# Patient Record
Sex: FEMALE | Race: WHITE | NOT HISPANIC OR LATINO | URBAN - METROPOLITAN AREA
[De-identification: names, ages, dates, MRNs, and addresses within clinical notes are randomized per-mention and may not be internally consistent; named-entity substitution may affect disease eponyms.]

---

## 2017-09-22 ENCOUNTER — EMERGENCY (EMERGENCY)
Facility: HOSPITAL | Age: 27
LOS: 1 days | Discharge: HOME | End: 2017-09-22
Attending: EMERGENCY MEDICINE | Admitting: EMERGENCY MEDICINE
Payer: MEDICAID

## 2017-09-22 VITALS
RESPIRATION RATE: 18 BRPM | TEMPERATURE: 98 F | DIASTOLIC BLOOD PRESSURE: 69 MMHG | SYSTOLIC BLOOD PRESSURE: 108 MMHG | OXYGEN SATURATION: 98 % | HEART RATE: 98 BPM

## 2017-09-22 LAB
ALBUMIN SERPL ELPH-MCNC: 4.6 G/DL — SIGNIFICANT CHANGE UP (ref 3.3–5)
ALP SERPL-CCNC: 77 U/L — SIGNIFICANT CHANGE UP (ref 40–120)
ALT FLD-CCNC: 14 U/L — SIGNIFICANT CHANGE UP (ref 4–33)
AST SERPL-CCNC: 14 U/L — SIGNIFICANT CHANGE UP (ref 4–32)
BASOPHILS # BLD AUTO: 0.06 K/UL — SIGNIFICANT CHANGE UP (ref 0–0.2)
BASOPHILS NFR BLD AUTO: 0.6 % — SIGNIFICANT CHANGE UP (ref 0–2)
BILIRUB SERPL-MCNC: 0.2 MG/DL — SIGNIFICANT CHANGE UP (ref 0.2–1.2)
BUN SERPL-MCNC: 10 MG/DL — SIGNIFICANT CHANGE UP (ref 7–23)
CALCIUM SERPL-MCNC: 9.1 MG/DL — SIGNIFICANT CHANGE UP (ref 8.4–10.5)
CHLORIDE SERPL-SCNC: 107 MMOL/L — SIGNIFICANT CHANGE UP (ref 98–107)
CK SERPL-CCNC: 60 U/L — SIGNIFICANT CHANGE UP (ref 25–170)
CO2 SERPL-SCNC: 27 MMOL/L — SIGNIFICANT CHANGE UP (ref 22–31)
CREAT SERPL-MCNC: 0.73 MG/DL — SIGNIFICANT CHANGE UP (ref 0.5–1.3)
EOSINOPHIL # BLD AUTO: 0.11 K/UL — SIGNIFICANT CHANGE UP (ref 0–0.5)
EOSINOPHIL NFR BLD AUTO: 1 % — SIGNIFICANT CHANGE UP (ref 0–6)
GLUCOSE SERPL-MCNC: 99 MG/DL — SIGNIFICANT CHANGE UP (ref 70–99)
HCT VFR BLD CALC: 40.8 % — SIGNIFICANT CHANGE UP (ref 34.5–45)
HGB BLD-MCNC: 13.9 G/DL — SIGNIFICANT CHANGE UP (ref 11.5–15.5)
IMM GRANULOCYTES # BLD AUTO: 0.03 # — SIGNIFICANT CHANGE UP
IMM GRANULOCYTES NFR BLD AUTO: 0.3 % — SIGNIFICANT CHANGE UP (ref 0–1.5)
LYMPHOCYTES # BLD AUTO: 3.21 K/UL — SIGNIFICANT CHANGE UP (ref 1–3.3)
LYMPHOCYTES # BLD AUTO: 30.3 % — SIGNIFICANT CHANGE UP (ref 13–44)
MCHC RBC-ENTMCNC: 27.9 PG — SIGNIFICANT CHANGE UP (ref 27–34)
MCHC RBC-ENTMCNC: 34.1 % — SIGNIFICANT CHANGE UP (ref 32–36)
MCV RBC AUTO: 81.9 FL — SIGNIFICANT CHANGE UP (ref 80–100)
MONOCYTES # BLD AUTO: 0.68 K/UL — SIGNIFICANT CHANGE UP (ref 0–0.9)
MONOCYTES NFR BLD AUTO: 6.4 % — SIGNIFICANT CHANGE UP (ref 2–14)
NEUTROPHILS # BLD AUTO: 6.51 K/UL — SIGNIFICANT CHANGE UP (ref 1.8–7.4)
NEUTROPHILS NFR BLD AUTO: 61.4 % — SIGNIFICANT CHANGE UP (ref 43–77)
NRBC # FLD: 0 — SIGNIFICANT CHANGE UP
PLATELET # BLD AUTO: 293 K/UL — SIGNIFICANT CHANGE UP (ref 150–400)
PMV BLD: 9.7 FL — SIGNIFICANT CHANGE UP (ref 7–13)
POTASSIUM SERPL-MCNC: 4.1 MMOL/L — SIGNIFICANT CHANGE UP (ref 3.5–5.3)
POTASSIUM SERPL-SCNC: 4.1 MMOL/L — SIGNIFICANT CHANGE UP (ref 3.5–5.3)
PROT SERPL-MCNC: 7.5 G/DL — SIGNIFICANT CHANGE UP (ref 6–8.3)
RBC # BLD: 4.98 M/UL — SIGNIFICANT CHANGE UP (ref 3.8–5.2)
RBC # FLD: 12.6 % — SIGNIFICANT CHANGE UP (ref 10.3–14.5)
SODIUM SERPL-SCNC: 149 MMOL/L — HIGH (ref 135–145)
TSH SERPL-MCNC: 1.14 UIU/ML — SIGNIFICANT CHANGE UP (ref 0.27–4.2)
WBC # BLD: 10.6 K/UL — HIGH (ref 3.8–10.5)
WBC # FLD AUTO: 10.6 K/UL — HIGH (ref 3.8–10.5)

## 2017-09-22 PROCEDURE — 71020: CPT | Mod: 26

## 2017-09-22 PROCEDURE — 99284 EMERGENCY DEPT VISIT MOD MDM: CPT | Mod: 25

## 2017-09-22 PROCEDURE — 93010 ELECTROCARDIOGRAM REPORT: CPT

## 2017-09-22 NOTE — ED PROVIDER NOTE - MEDICAL DECISION MAKING DETAILS
27F with palpitation, chest pressure x years, normal EKG. Plan for TSH to assess for thyroid abnormalities, cbc for anemia, cmp for electrolyte abnormalities, ck given myalgias. Reassess. Rheum/cards f/u if results all WNL.

## 2017-09-22 NOTE — ED PROVIDER NOTE - OBJECTIVE STATEMENT
27F no PMH 27F no PMH p/w acute on chronic episode of palpitations and chest pressure. Pt states since 2015 has had intermittent episodes of palpitations and generalized weakness. No clear exacerbating or relieving factors. Has seen her primary care physician a few times for symptoms and has been told it is due to anxiety. Denies fever/chills/NVD.

## 2017-09-22 NOTE — ED PROVIDER NOTE - ATTENDING CONTRIBUTION TO CARE
agree with resident note  27 yr old female with a variety of complaints with palpitations, anxiety and multiple extremities causing pain.  States this has been ongoing for 2 years since childbirth.  Has seen a multitude of doctors from PMD to ENT.  4 weeks ago baby's father passed away unexpectedly.  As of yet has not seen a psychiatrist.  No acute nature to any of the complaints    PE: stressed, tearful, VSS, CTAB/L; s1 s2 no m/r/g abd: soft/NT/ND ext: no edema    Imp: postpartum depression, stressors, anxiety

## 2018-02-12 ENCOUNTER — EMERGENCY (EMERGENCY)
Facility: HOSPITAL | Age: 28
LOS: 1 days | Discharge: HOME | End: 2018-02-12
Attending: EMERGENCY MEDICINE | Admitting: EMERGENCY MEDICINE
Payer: MEDICAID

## 2018-02-12 VITALS
RESPIRATION RATE: 18 BRPM | DIASTOLIC BLOOD PRESSURE: 81 MMHG | TEMPERATURE: 98 F | SYSTOLIC BLOOD PRESSURE: 124 MMHG | HEART RATE: 91 BPM | OXYGEN SATURATION: 100 %

## 2018-02-12 LAB
ALBUMIN SERPL ELPH-MCNC: 4.7 G/DL — SIGNIFICANT CHANGE UP (ref 3.3–5)
ALP SERPL-CCNC: 74 U/L — SIGNIFICANT CHANGE UP (ref 40–120)
ALT FLD-CCNC: 18 U/L — SIGNIFICANT CHANGE UP (ref 4–33)
APPEARANCE UR: SIGNIFICANT CHANGE UP
AST SERPL-CCNC: 17 U/L — SIGNIFICANT CHANGE UP (ref 4–32)
BACTERIA # UR AUTO: SIGNIFICANT CHANGE UP
BASOPHILS # BLD AUTO: 0.05 K/UL — SIGNIFICANT CHANGE UP (ref 0–0.2)
BASOPHILS NFR BLD AUTO: 0.5 % — SIGNIFICANT CHANGE UP (ref 0–2)
BILIRUB SERPL-MCNC: 0.2 MG/DL — SIGNIFICANT CHANGE UP (ref 0.2–1.2)
BILIRUB UR-MCNC: NEGATIVE — SIGNIFICANT CHANGE UP
BLOOD UR QL VISUAL: HIGH
BUN SERPL-MCNC: 9 MG/DL — SIGNIFICANT CHANGE UP (ref 7–23)
CALCIUM SERPL-MCNC: 9.2 MG/DL — SIGNIFICANT CHANGE UP (ref 8.4–10.5)
CHLORIDE SERPL-SCNC: 101 MMOL/L — SIGNIFICANT CHANGE UP (ref 98–107)
CO2 SERPL-SCNC: 27 MMOL/L — SIGNIFICANT CHANGE UP (ref 22–31)
COLOR SPEC: SIGNIFICANT CHANGE UP
CREAT SERPL-MCNC: 0.77 MG/DL — SIGNIFICANT CHANGE UP (ref 0.5–1.3)
EOSINOPHIL # BLD AUTO: 0.05 K/UL — SIGNIFICANT CHANGE UP (ref 0–0.5)
EOSINOPHIL NFR BLD AUTO: 0.5 % — SIGNIFICANT CHANGE UP (ref 0–6)
GLUCOSE SERPL-MCNC: 80 MG/DL — SIGNIFICANT CHANGE UP (ref 70–99)
GLUCOSE UR-MCNC: NEGATIVE — SIGNIFICANT CHANGE UP
HCT VFR BLD CALC: 40.9 % — SIGNIFICANT CHANGE UP (ref 34.5–45)
HGB BLD-MCNC: 13.6 G/DL — SIGNIFICANT CHANGE UP (ref 11.5–15.5)
IMM GRANULOCYTES # BLD AUTO: 0.03 # — SIGNIFICANT CHANGE UP
IMM GRANULOCYTES NFR BLD AUTO: 0.3 % — SIGNIFICANT CHANGE UP (ref 0–1.5)
KETONES UR-MCNC: NEGATIVE — SIGNIFICANT CHANGE UP
LEUKOCYTE ESTERASE UR-ACNC: SIGNIFICANT CHANGE UP
LYMPHOCYTES # BLD AUTO: 2.68 K/UL — SIGNIFICANT CHANGE UP (ref 1–3.3)
LYMPHOCYTES # BLD AUTO: 28.9 % — SIGNIFICANT CHANGE UP (ref 13–44)
MCHC RBC-ENTMCNC: 27.5 PG — SIGNIFICANT CHANGE UP (ref 27–34)
MCHC RBC-ENTMCNC: 33.3 % — SIGNIFICANT CHANGE UP (ref 32–36)
MCV RBC AUTO: 82.6 FL — SIGNIFICANT CHANGE UP (ref 80–100)
MONOCYTES # BLD AUTO: 0.66 K/UL — SIGNIFICANT CHANGE UP (ref 0–0.9)
MONOCYTES NFR BLD AUTO: 7.1 % — SIGNIFICANT CHANGE UP (ref 2–14)
NEUTROPHILS # BLD AUTO: 5.8 K/UL — SIGNIFICANT CHANGE UP (ref 1.8–7.4)
NEUTROPHILS NFR BLD AUTO: 62.7 % — SIGNIFICANT CHANGE UP (ref 43–77)
NITRITE UR-MCNC: NEGATIVE — SIGNIFICANT CHANGE UP
NRBC # FLD: 0 — SIGNIFICANT CHANGE UP
PH UR: 6.5 — SIGNIFICANT CHANGE UP (ref 4.6–8)
PLATELET # BLD AUTO: 306 K/UL — SIGNIFICANT CHANGE UP (ref 150–400)
PMV BLD: 9.4 FL — SIGNIFICANT CHANGE UP (ref 7–13)
POTASSIUM SERPL-MCNC: 4 MMOL/L — SIGNIFICANT CHANGE UP (ref 3.5–5.3)
POTASSIUM SERPL-SCNC: 4 MMOL/L — SIGNIFICANT CHANGE UP (ref 3.5–5.3)
PROT SERPL-MCNC: 7.6 G/DL — SIGNIFICANT CHANGE UP (ref 6–8.3)
PROT UR-MCNC: 20 MG/DL — SIGNIFICANT CHANGE UP
RBC # BLD: 4.95 M/UL — SIGNIFICANT CHANGE UP (ref 3.8–5.2)
RBC # FLD: 12.8 % — SIGNIFICANT CHANGE UP (ref 10.3–14.5)
RBC CASTS # UR COMP ASSIST: SIGNIFICANT CHANGE UP (ref 0–?)
SODIUM SERPL-SCNC: 140 MMOL/L — SIGNIFICANT CHANGE UP (ref 135–145)
SP GR SPEC: 1.01 — SIGNIFICANT CHANGE UP (ref 1–1.04)
SQUAMOUS # UR AUTO: SIGNIFICANT CHANGE UP
TSH SERPL-MCNC: 1.65 UIU/ML — SIGNIFICANT CHANGE UP (ref 0.27–4.2)
UROBILINOGEN FLD QL: NORMAL MG/DL — SIGNIFICANT CHANGE UP
WBC # BLD: 9.27 K/UL — SIGNIFICANT CHANGE UP (ref 3.8–10.5)
WBC # FLD AUTO: 9.27 K/UL — SIGNIFICANT CHANGE UP (ref 3.8–10.5)
WBC UR QL: HIGH (ref 0–?)

## 2018-02-12 PROCEDURE — 99283 EMERGENCY DEPT VISIT LOW MDM: CPT

## 2018-02-12 PROCEDURE — 71046 X-RAY EXAM CHEST 2 VIEWS: CPT | Mod: 26

## 2018-02-12 RX ORDER — LORATADINE 10 MG/1
10 TABLET ORAL ONCE
Qty: 0 | Refills: 0 | Status: COMPLETED | OUTPATIENT
Start: 2018-02-12 | End: 2018-02-12

## 2018-02-12 RX ADMIN — LORATADINE 10 MILLIGRAM(S): 10 TABLET ORAL at 18:38

## 2018-02-12 NOTE — ED PROVIDER NOTE - OBJECTIVE STATEMENT
This patient is a 27y female w no sig PMHx p/w chronic eye irritation, headache, and lymphadenopathy. She states that ever since having a  two years ago her eyes have bothered her; she has seen an optometrist who has relayed that her vision continues to be 20/20, but the patient states they seem more irritable and get momentarily blurry sometimes. She also states that she has had myalgias, constipation, and headache since her . Last night she noticed two swollen lymph nodes on the R posterior cervical neck. Patient denies fatigue, denies f/c, denies neck pain or nuchal rigidity. She does report a chronic dry cough.

## 2018-02-12 NOTE — ED PROVIDER NOTE - MEDICAL DECISION MAKING DETAILS
27y female w no sig PMHx p/w chronic eye irritation, headache, and lymphadenopathy. Pt symptoms are chronic in nature, no true visual disturbance, eye irritation and itchiness associated w slightly pruritic rash on pt. 2 swollen posterior cervical lymph nodes on exam. Pt anxious abt her symptoms, will acquire basic lamps for assurance. She also has a chronic cough, will obtain cxr to r/o lung pathology. All labs WNL, cxr clear, admin claritin for pruritis relief as pt has to drive child home from ED later. Stable for [facility]

## 2018-02-12 NOTE — ED ADULT TRIAGE NOTE - CHIEF COMPLAINT QUOTE
Co headaches and blurry vision since yesterday. States she felt two small bumps on back of neck today. Denies fevers, chills. Co headaches and blurry vision since yesterday. States she felt two small bumps on back of neck today which are painful to touch. Denies fevers, chills.

## 2018-02-12 NOTE — ED ADULT NURSE NOTE - CHIEF COMPLAINT QUOTE
Co headaches and blurry vision since yesterday. States she felt two small bumps on back of neck today which are painful to touch. Denies fevers, chills.

## 2018-02-12 NOTE — ED ADULT NURSE NOTE - OBJECTIVE STATEMENT
Pt received to room 3 in no acute distress she is currently complaining of right neck pain pt states she has a lump there and to the back of the ear area pt is noted with a lump no drainage noted pt denies nausea vomiting or fever. states she feels tired. An iv was accessed labs sent

## 2018-02-12 NOTE — ED PROVIDER NOTE - CHIEF COMPLAINT
The patient is a 27y Female complaining of The patient is a 27y Female complaining of swelling on right neck.

## 2018-02-12 NOTE — ED PROVIDER NOTE - ATTENDING CONTRIBUTION TO CARE
Patient is a 28 yo F with no chronic medical problems here for evaluation of right neck swelling. Patient reports some mild ear discomfort, mild pruritic rash that started days ago. Denies known sick contacts, sore throat, fever, travel. + mild headache. + mild dry cough. Patient is here with her child. She is very well appearing. She states she is anxious about getting the flu.   VS noted  Gen. no acute distress, Non toxic   HEENT: EOMI, mmm, pharynx w/o erythema or exudate, b/l TM normal, lymphadenopathy posterior neck, no tenderness, no fluctuance  Lungs: CTAB/L no C/ W /R   CVS: RRR   Abd; Soft non tender, non distended   Ext: no edema  Skin: urticarial rash on chest and back  Neuro AAOx3, CN 2-12 intact, gait normal, clear speech  a/p: myalgias, mild headache, lymphadenopathy - possible viral syndrome, urticarial rash - will treat symptomatically - will check labs, u/a, CXR.   - Libra WARREN

## 2018-02-14 LAB
BACTERIA UR CULT: SIGNIFICANT CHANGE UP
SPECIMEN SOURCE: SIGNIFICANT CHANGE UP

## 2018-02-19 ENCOUNTER — EMERGENCY (EMERGENCY)
Facility: HOSPITAL | Age: 28
LOS: 1 days | Discharge: HOME | End: 2018-02-19
Attending: EMERGENCY MEDICINE | Admitting: EMERGENCY MEDICINE
Payer: MEDICAID

## 2018-02-19 VITALS
TEMPERATURE: 98 F | SYSTOLIC BLOOD PRESSURE: 110 MMHG | DIASTOLIC BLOOD PRESSURE: 72 MMHG | RESPIRATION RATE: 18 BRPM | OXYGEN SATURATION: 98 %

## 2018-02-19 DIAGNOSIS — Z98.891 HISTORY OF UTERINE SCAR FROM PREVIOUS SURGERY: Chronic | ICD-10-CM

## 2018-02-19 PROCEDURE — 99283 EMERGENCY DEPT VISIT LOW MDM: CPT

## 2018-02-19 RX ORDER — FAMOTIDINE 10 MG/ML
1 INJECTION INTRAVENOUS
Qty: 7 | Refills: 0
Start: 2018-02-19 | End: 2018-02-25

## 2018-02-19 NOTE — ED PROVIDER NOTE - MEDICAL DECISION MAKING DETAILS
28 y/o F pt with throat pain and difficulty swallowing. Dx is likely Pharyngitis. Possibly related to sick contact with Croup. Out-pt OTC and steroids.

## 2018-02-19 NOTE — ED PROVIDER NOTE - PLAN OF CARE
Seen in ED for pharyngitis, swelling and inflammation. First please try Motrin 600 mg every 6 hours and/or Tylenol 650 mg every 4 hours. Can also try Benadryl 25 mg one tab every 4-6 hours. If no improvement take Prednisone 20 mg two tab x 3 days then 20 mg 1 tab x 3 days. While on Prednisone please take Pepcid 40 mg one tab daily to reduce stomach irritation. Please follow up with your Primary MD in 24-48 hr.  Seek immediate medical care for any new/worsening signs or symptoms.

## 2018-02-19 NOTE — ED PROVIDER NOTE - NS_ ATTENDINGSCRIBEDETAILS _ED_A_ED_FT
Dr. Spence:  I personally performed the services described in the documentation, reviewed the documentation recorded by the scribe in my presence and it accurately and completely records my words and action. The scribe's documentation has been prepared under my direction and personally reviewed by me in its entirety. I confirm that the note above accurately reflects all work, treatment, procedures, and medical decision making performed by me.

## 2018-02-19 NOTE — ED PROVIDER NOTE - OBJECTIVE STATEMENT
26 y/o F pt with PMHx of Thyroid Nodules and PSHx of , arrives to the ED c/o throat pain, difficulty swallowing with associated decreased eating/drinking for 3 days. Sick contacts:+, son dx with Croup. Denies SOB, fever, or any other complaints. No daily meds. Allergic to Toradol.

## 2018-02-19 NOTE — ED PROVIDER NOTE - CARE PLAN
Principal Discharge DX:	Pharyngitis, acute  Assessment and plan of treatment:	Seen in ED for pharyngitis, swelling and inflammation. First please try Motrin 600 mg every 6 hours and/or Tylenol 650 mg every 4 hours. Can also try Benadryl 25 mg one tab every 4-6 hours. If no improvement take Prednisone 20 mg two tab x 3 days then 20 mg 1 tab x 3 days. While on Prednisone please take Pepcid 40 mg one tab daily to reduce stomach irritation. Please follow up with your Primary MD in 24-48 hr.  Seek immediate medical care for any new/worsening signs or symptoms.

## 2018-03-02 ENCOUNTER — EMERGENCY (EMERGENCY)
Facility: HOSPITAL | Age: 28
LOS: 1 days | Discharge: HOME | End: 2018-03-02
Attending: EMERGENCY MEDICINE | Admitting: EMERGENCY MEDICINE
Payer: MEDICAID

## 2018-03-02 VITALS
RESPIRATION RATE: 17 BRPM | SYSTOLIC BLOOD PRESSURE: 123 MMHG | DIASTOLIC BLOOD PRESSURE: 68 MMHG | HEART RATE: 99 BPM | OXYGEN SATURATION: 99 % | TEMPERATURE: 98 F

## 2018-03-02 DIAGNOSIS — Z98.891 HISTORY OF UTERINE SCAR FROM PREVIOUS SURGERY: Chronic | ICD-10-CM

## 2018-03-02 PROCEDURE — 99283 EMERGENCY DEPT VISIT LOW MDM: CPT

## 2018-03-02 PROCEDURE — 71046 X-RAY EXAM CHEST 2 VIEWS: CPT | Mod: 26

## 2018-03-02 RX ORDER — ACETAMINOPHEN 500 MG
650 TABLET ORAL ONCE
Qty: 0 | Refills: 0 | Status: COMPLETED | OUTPATIENT
Start: 2018-03-02 | End: 2018-03-02

## 2018-03-02 RX ADMIN — Medication 650 MILLIGRAM(S): at 17:57

## 2018-03-02 NOTE — ED PROVIDER NOTE - OBJECTIVE STATEMENT
28 y/o F pt with PMHx of Thyroid nodules, arrives to the ED c/o worsening cough (yellow phlegm), CP, and myalgias for 2 weeks. Pt reports that she was here two weeks ago, and states a questionable dx of Laryngitis; pt was d/c with abx (unknown), that she hasn't taken because she wanted to "take home remedies instead," (no relief). Also c/o resolved jaw pain. Denies fever, chills or any other complaints. No daily meds. NKDA 28 y/o F pt with PMHx of Thyroid nodules, arrives to the ED c/o worsening cough (yellow phlegm), CP, and myalgias for 2-3 weeks. Pt reports that she was here two weeks ago, and states a questionable dx of Laryngitis; pt was d/c with abx (unknown), that she hasn't taken because she wanted to "take home remedies instead," (no relief). Also c/o resolved jaw pain. Denies fever, chills or any other complaints. No daily meds. NKDA

## 2018-03-02 NOTE — ED ADULT TRIAGE NOTE - CHIEF COMPLAINT QUOTE
pt c/o chest pain associated with cough and yellow phlegm. states she developed upper back pain yesterday.

## 2018-03-02 NOTE — ED PROVIDER NOTE - MEDICAL DECISION MAKING DETAILS
26 y/o F pt with cough. Likely viral URI vs. Chronic Bronchitis. Obtain chest XR, and Tylenol. Pt is hesitant on taking other meds., so hold off on Z-pac and Steroids.

## 2018-07-21 ENCOUNTER — EMERGENCY (EMERGENCY)
Facility: HOSPITAL | Age: 28
LOS: 1 days | Discharge: HOME | End: 2018-07-21
Admitting: EMERGENCY MEDICINE
Payer: MEDICAID

## 2018-07-21 VITALS
HEART RATE: 99 BPM | OXYGEN SATURATION: 100 % | TEMPERATURE: 98 F | RESPIRATION RATE: 18 BRPM | SYSTOLIC BLOOD PRESSURE: 120 MMHG | DIASTOLIC BLOOD PRESSURE: 85 MMHG

## 2018-07-21 DIAGNOSIS — Z98.891 HISTORY OF UTERINE SCAR FROM PREVIOUS SURGERY: Chronic | ICD-10-CM

## 2018-07-21 LAB
ALBUMIN SERPL ELPH-MCNC: 4.2 G/DL — SIGNIFICANT CHANGE UP (ref 3.3–5)
ALP SERPL-CCNC: 80 U/L — SIGNIFICANT CHANGE UP (ref 40–120)
ALT FLD-CCNC: 9 U/L — SIGNIFICANT CHANGE UP (ref 4–33)
APPEARANCE UR: CLEAR — SIGNIFICANT CHANGE UP
AST SERPL-CCNC: 14 U/L — SIGNIFICANT CHANGE UP (ref 4–32)
BASOPHILS # BLD AUTO: 0.06 K/UL — SIGNIFICANT CHANGE UP (ref 0–0.2)
BASOPHILS NFR BLD AUTO: 0.5 % — SIGNIFICANT CHANGE UP (ref 0–2)
BILIRUB SERPL-MCNC: < 0.2 MG/DL — LOW (ref 0.2–1.2)
BILIRUB UR-MCNC: NEGATIVE — SIGNIFICANT CHANGE UP
BLOOD UR QL VISUAL: NEGATIVE — SIGNIFICANT CHANGE UP
BUN SERPL-MCNC: 13 MG/DL — SIGNIFICANT CHANGE UP (ref 7–23)
CALCIUM SERPL-MCNC: 9 MG/DL — SIGNIFICANT CHANGE UP (ref 8.4–10.5)
CHLORIDE SERPL-SCNC: 102 MMOL/L — SIGNIFICANT CHANGE UP (ref 98–107)
CO2 SERPL-SCNC: 26 MMOL/L — SIGNIFICANT CHANGE UP (ref 22–31)
COLOR SPEC: YELLOW — SIGNIFICANT CHANGE UP
CREAT SERPL-MCNC: 1.44 MG/DL — HIGH (ref 0.5–1.3)
EOSINOPHIL # BLD AUTO: 0.07 K/UL — SIGNIFICANT CHANGE UP (ref 0–0.5)
EOSINOPHIL NFR BLD AUTO: 0.6 % — SIGNIFICANT CHANGE UP (ref 0–6)
GLUCOSE SERPL-MCNC: 103 MG/DL — HIGH (ref 70–99)
GLUCOSE UR-MCNC: NEGATIVE — SIGNIFICANT CHANGE UP
HCT VFR BLD CALC: 38.7 % — SIGNIFICANT CHANGE UP (ref 34.5–45)
HGB BLD-MCNC: 12.9 G/DL — SIGNIFICANT CHANGE UP (ref 11.5–15.5)
IMM GRANULOCYTES # BLD AUTO: 0.04 # — SIGNIFICANT CHANGE UP
IMM GRANULOCYTES NFR BLD AUTO: 0.4 % — SIGNIFICANT CHANGE UP (ref 0–1.5)
KETONES UR-MCNC: NEGATIVE — SIGNIFICANT CHANGE UP
LEUKOCYTE ESTERASE UR-ACNC: NEGATIVE — SIGNIFICANT CHANGE UP
LYMPHOCYTES # BLD AUTO: 28.3 % — SIGNIFICANT CHANGE UP (ref 13–44)
LYMPHOCYTES # BLD AUTO: 3.15 K/UL — SIGNIFICANT CHANGE UP (ref 1–3.3)
MCHC RBC-ENTMCNC: 27.6 PG — SIGNIFICANT CHANGE UP (ref 27–34)
MCHC RBC-ENTMCNC: 33.3 % — SIGNIFICANT CHANGE UP (ref 32–36)
MCV RBC AUTO: 82.7 FL — SIGNIFICANT CHANGE UP (ref 80–100)
MONOCYTES # BLD AUTO: 1.08 K/UL — HIGH (ref 0–0.9)
MONOCYTES NFR BLD AUTO: 9.7 % — SIGNIFICANT CHANGE UP (ref 2–14)
MUCOUS THREADS # UR AUTO: SIGNIFICANT CHANGE UP
NEUTROPHILS # BLD AUTO: 6.74 K/UL — SIGNIFICANT CHANGE UP (ref 1.8–7.4)
NEUTROPHILS NFR BLD AUTO: 60.5 % — SIGNIFICANT CHANGE UP (ref 43–77)
NITRITE UR-MCNC: NEGATIVE — SIGNIFICANT CHANGE UP
NRBC # FLD: 0 — SIGNIFICANT CHANGE UP
PH UR: 7 — SIGNIFICANT CHANGE UP (ref 4.6–8)
PLATELET # BLD AUTO: 266 K/UL — SIGNIFICANT CHANGE UP (ref 150–400)
PMV BLD: 9.4 FL — SIGNIFICANT CHANGE UP (ref 7–13)
POTASSIUM SERPL-MCNC: 3.9 MMOL/L — SIGNIFICANT CHANGE UP (ref 3.5–5.3)
POTASSIUM SERPL-SCNC: 3.9 MMOL/L — SIGNIFICANT CHANGE UP (ref 3.5–5.3)
PROT SERPL-MCNC: 7.3 G/DL — SIGNIFICANT CHANGE UP (ref 6–8.3)
PROT UR-MCNC: 20 MG/DL — SIGNIFICANT CHANGE UP
RBC # BLD: 4.68 M/UL — SIGNIFICANT CHANGE UP (ref 3.8–5.2)
RBC # FLD: 13.5 % — SIGNIFICANT CHANGE UP (ref 10.3–14.5)
RBC CASTS # UR COMP ASSIST: SIGNIFICANT CHANGE UP (ref 0–?)
SODIUM SERPL-SCNC: 139 MMOL/L — SIGNIFICANT CHANGE UP (ref 135–145)
SP GR SPEC: 1.03 — SIGNIFICANT CHANGE UP (ref 1–1.04)
SQUAMOUS # UR AUTO: SIGNIFICANT CHANGE UP
UROBILINOGEN FLD QL: NORMAL MG/DL — SIGNIFICANT CHANGE UP
WBC # BLD: 11.14 K/UL — HIGH (ref 3.8–10.5)
WBC # FLD AUTO: 11.14 K/UL — HIGH (ref 3.8–10.5)
WBC UR QL: SIGNIFICANT CHANGE UP (ref 0–?)

## 2018-07-21 PROCEDURE — 99284 EMERGENCY DEPT VISIT MOD MDM: CPT

## 2018-07-21 PROCEDURE — 74177 CT ABD & PELVIS W/CONTRAST: CPT | Mod: 26

## 2018-07-21 RX ORDER — SODIUM CHLORIDE 9 MG/ML
1000 INJECTION INTRAMUSCULAR; INTRAVENOUS; SUBCUTANEOUS ONCE
Qty: 0 | Refills: 0 | Status: COMPLETED | OUTPATIENT
Start: 2018-07-21 | End: 2018-07-21

## 2018-07-21 RX ORDER — ACETAMINOPHEN 500 MG
650 TABLET ORAL ONCE
Qty: 0 | Refills: 0 | Status: COMPLETED | OUTPATIENT
Start: 2018-07-21 | End: 2018-07-21

## 2018-07-21 RX ADMIN — Medication 650 MILLIGRAM(S): at 21:27

## 2018-07-21 RX ADMIN — SODIUM CHLORIDE 1000 MILLILITER(S): 9 INJECTION INTRAMUSCULAR; INTRAVENOUS; SUBCUTANEOUS at 21:27

## 2018-07-21 NOTE — ED ADULT NURSE NOTE - CHIEF COMPLAINT QUOTE
Co mid back pain radiating to right side of back x 4 days. States pain on right side is "sharp". Denies heavy lifting. Denies dysuria. Ambulating in triage. States she believes she may be pregnant, states her menstrual period is 3 days late.

## 2018-07-21 NOTE — ED PROVIDER NOTE - OBJECTIVE STATEMENT
26 y/o female no pmh c/o back pain and lower abd pain x4 days. Pt admits to mid/right back pain, worse with lying on her R side. Pt also c/o suprapubic/RLQ abd pain. Pt admits to minimal discomfort in abd when urinating but denies frequency, urgency or dysuria. Pt denies chest pain, sob, n/v/d, vaginal bleeding or discharge, numbness, tingling, weakness, bowel or bladder incontinence, dizziness, syncope, fever or chills. LMP 6/15.

## 2018-07-21 NOTE — ED ADULT NURSE NOTE - OBJECTIVE STATEMENT
received pt to intake room 1 for evaluation of generalized back pain and lower abdominal pain worse on RLQ x 4 days, with discomfort on urination. pt presents awake a&ox4, denies dizziness or ha. skin warm, dry, appropriate for race. respirations even, unlabored. denies cp or sob. abdomen soft nontender nondistended. denies n/v/d/fevers or chills. denies any additional urinary symptoms. ivl placed. bloods drawn and sent. ns 0.9% bolus infusing. pt medicated with tylenol 650 mg PO. safety maintained. pending disposition.

## 2018-07-21 NOTE — ED PROVIDER NOTE - PLAN OF CARE
See your primary care doctor within 24-48 hours. See your gyn this week for further evaluation of your ovarian cyst, bring copies of all reports with you. Drink plenty of fluids, make sure to repeat your blood work (creatinine/kidney function) with your doctor. Your doctor may also want to do an MRI of your liver to further investigate the cyst found on your liver (see ct report). Return to the ER for worsening symptoms or any other concerns.

## 2018-07-21 NOTE — ED PROVIDER NOTE - CARE PLAN
Principal Discharge DX:	Abdominal pain  Assessment and plan of treatment:	See your primary care doctor within 24-48 hours. See your gyn this week for further evaluation of your ovarian cyst, bring copies of all reports with you. Drink plenty of fluids, make sure to repeat your blood work (creatinine/kidney function) with your doctor. Your doctor may also want to do an MRI of your liver to further investigate the cyst found on your liver (see ct report). Return to the ER for worsening symptoms or any other concerns.

## 2018-07-21 NOTE — ED ADULT TRIAGE NOTE - CHIEF COMPLAINT QUOTE
Co mid back pain radiating to right side of back x 4 days. States pain on right side is "sharp". Denies heavy lifting. Denies dysuria. Ambulating in triage. Co mid back pain radiating to right side of back x 4 days. States pain on right side is "sharp". Denies heavy lifting. Denies dysuria. Ambulating in triage. States she believes she may be pregnant, states her menstrual period is 3 days late.

## 2018-07-21 NOTE — ED PROVIDER NOTE - PROGRESS NOTE DETAILS
MARVIN Wall: Received sign out from MARVIN Saha to reassess pt and follow up on CT abdomen. CT c/w R corpus luteum cyst, also ?cyst vs. hemangioma of liver. Creatinine slightly elevated. Pt informed of all results, will follow up with her primary doctor and gyn for further evaluation.

## 2018-07-22 PROBLEM — E04.1 NONTOXIC SINGLE THYROID NODULE: Chronic | Status: ACTIVE | Noted: 2018-02-19

## 2018-08-15 ENCOUNTER — EMERGENCY (EMERGENCY)
Facility: HOSPITAL | Age: 28
LOS: 1 days | Discharge: HOME | End: 2018-08-15
Attending: EMERGENCY MEDICINE | Admitting: EMERGENCY MEDICINE
Payer: MEDICAID

## 2018-08-15 VITALS
RESPIRATION RATE: 18 BRPM | SYSTOLIC BLOOD PRESSURE: 111 MMHG | TEMPERATURE: 98 F | HEART RATE: 83 BPM | DIASTOLIC BLOOD PRESSURE: 71 MMHG | OXYGEN SATURATION: 100 %

## 2018-08-15 DIAGNOSIS — Z98.891 HISTORY OF UTERINE SCAR FROM PREVIOUS SURGERY: Chronic | ICD-10-CM

## 2018-08-15 PROCEDURE — 99283 EMERGENCY DEPT VISIT LOW MDM: CPT

## 2018-08-15 RX ORDER — IBUPROFEN 200 MG
400 TABLET ORAL ONCE
Qty: 0 | Refills: 0 | Status: COMPLETED | OUTPATIENT
Start: 2018-08-15 | End: 2018-08-15

## 2018-08-15 RX ORDER — ACETAMINOPHEN 500 MG
650 TABLET ORAL ONCE
Qty: 0 | Refills: 0 | Status: COMPLETED | OUTPATIENT
Start: 2018-08-15 | End: 2018-08-15

## 2018-08-15 RX ADMIN — Medication 400 MILLIGRAM(S): at 19:09

## 2018-08-15 RX ADMIN — Medication 650 MILLIGRAM(S): at 19:05

## 2018-08-15 NOTE — ED PROVIDER NOTE - MEDICAL DECISION MAKING DETAILS
29 y/o F pt with cough and throat pain. Likely viral illness. Obtain UCG. Give pain meds. Also recommend home remedies (honey), and instruct Tylenol and Motrin PRN. 29 y/o F pt with cough and throat pain. Likely viral illness. Pt advised that abx for this will likely not help and may cause diarrhea, pt agreeable.  Obtain UCG. Give pain meds. Also recommend home remedies (honey), and instruct Tylenol and Motrin PRN.  NB pt took ibuprofen here under supervision and was observed > 1/2 hour, no itching or other allergic sx appeared.

## 2018-08-15 NOTE — ED PROVIDER NOTE - OBJECTIVE STATEMENT
27 y/o F pt with PMHx of Thyroid nodule, arrives to the ED c/o a dry cough, and throat pain for 10 days  No recent travel. Sick contacts:+; child with similar sx. No meds taken. Pt also c/o "neck fullness;" pt states hx of these sx "a few months ago." Denies fever, rash or any other complaints. No daily meds. Allergic to Toradol

## 2018-09-04 ENCOUNTER — EMERGENCY (EMERGENCY)
Facility: HOSPITAL | Age: 28
LOS: 1 days | Discharge: HOME | End: 2018-09-04
Attending: EMERGENCY MEDICINE | Admitting: EMERGENCY MEDICINE
Payer: MEDICAID

## 2018-09-04 VITALS
TEMPERATURE: 98 F | HEART RATE: 80 BPM | DIASTOLIC BLOOD PRESSURE: 63 MMHG | OXYGEN SATURATION: 100 % | SYSTOLIC BLOOD PRESSURE: 95 MMHG | RESPIRATION RATE: 16 BRPM

## 2018-09-04 DIAGNOSIS — Z98.891 HISTORY OF UTERINE SCAR FROM PREVIOUS SURGERY: Chronic | ICD-10-CM

## 2018-09-04 PROCEDURE — 99283 EMERGENCY DEPT VISIT LOW MDM: CPT

## 2018-09-04 NOTE — ED PROVIDER NOTE - ENMT, MLM
Throat with minimal erythema. No exudate. No tonsillar swelling. Ears TM clear bilaterally. No exudate or erythema. Minimal anterior DEANA. No posterior cervical DEANA

## 2018-09-04 NOTE — ED PROVIDER NOTE - MEDICAL DECISION MAKING DETAILS
28y F presenting wit h1 month of sore throat and dry cough with increasing fatigue. Pt well appearing on exam with benign pharynx. Otherwise exam notable for mild splenomegaly. Possible infectious mononucleosis vs other viral infection. Do not suspect bacterial infection at this time given lack of findings. Send EBV titers and dc with supportive care

## 2018-09-04 NOTE — ED PROVIDER NOTE - OBJECTIVE STATEMENT
28y F with PMHx thyroid nodules presents to the ED for cough, sore throat, and pain when swallowing x1 month. Pt was seen 2.5 weeks ago and was given motrin and tylenol with no relief of s/x. Cough is dry. Does report feeling ear pressure and fatigued. No fever or chills. Able to eat and drink. Former smoker.

## 2018-09-04 NOTE — ED ADULT TRIAGE NOTE - CHIEF COMPLAINT QUOTE
pt states that she has been having a sore throat x 1 month, states that she was seen in ED for same 2 weeks ago, not feeling better.  PMH thyroid nodules

## 2018-09-05 LAB
EBV EA AB TITR SER IF: POSITIVE — SIGNIFICANT CHANGE UP
EBV EA IGG SER-ACNC: NEGATIVE — SIGNIFICANT CHANGE UP
EBV PATRN SPEC IB-IMP: SIGNIFICANT CHANGE UP
EBV VCA IGG AVIDITY SER QL IA: POSITIVE — SIGNIFICANT CHANGE UP
EBV VCA IGM TITR FLD: NEGATIVE — SIGNIFICANT CHANGE UP

## 2018-09-05 NOTE — ED POST DISCHARGE NOTE - REASON FOR FOLLOW-UP
Other Patient contacted discussed with patient Epstien Virus results. Discussed with patient will fax to MD Dr Radha Urbina and patient to call Dr Urbina to discussed with her results. Patient to call back Admin # with MD's phone # and or fax #.

## 2018-12-14 ENCOUNTER — EMERGENCY (EMERGENCY)
Facility: HOSPITAL | Age: 28
LOS: 1 days | Discharge: LEFT BEFORE DISCHARGE | End: 2018-12-14
Attending: EMERGENCY MEDICINE | Admitting: EMERGENCY MEDICINE
Payer: MEDICAID

## 2018-12-14 VITALS
DIASTOLIC BLOOD PRESSURE: 66 MMHG | TEMPERATURE: 98 F | SYSTOLIC BLOOD PRESSURE: 101 MMHG | RESPIRATION RATE: 18 BRPM | OXYGEN SATURATION: 99 % | HEART RATE: 91 BPM

## 2018-12-14 DIAGNOSIS — Z98.891 HISTORY OF UTERINE SCAR FROM PREVIOUS SURGERY: Chronic | ICD-10-CM

## 2018-12-14 LAB
APPEARANCE UR: SIGNIFICANT CHANGE UP
BACTERIA # UR AUTO: HIGH
BILIRUB UR-MCNC: NEGATIVE — SIGNIFICANT CHANGE UP
BLOOD UR QL VISUAL: NEGATIVE — SIGNIFICANT CHANGE UP
COLOR SPEC: YELLOW — SIGNIFICANT CHANGE UP
GLUCOSE UR-MCNC: NEGATIVE — SIGNIFICANT CHANGE UP
HYALINE CASTS # UR AUTO: HIGH
KETONES UR-MCNC: SIGNIFICANT CHANGE UP
LEUKOCYTE ESTERASE UR-ACNC: NEGATIVE — SIGNIFICANT CHANGE UP
MUCOUS THREADS # UR AUTO: SIGNIFICANT CHANGE UP
NITRITE UR-MCNC: NEGATIVE — SIGNIFICANT CHANGE UP
PH UR: 6.5 — SIGNIFICANT CHANGE UP (ref 5–8)
PROT UR-MCNC: 50 — SIGNIFICANT CHANGE UP
RBC CASTS # UR COMP ASSIST: SIGNIFICANT CHANGE UP (ref 0–?)
SP GR SPEC: 1.04 — SIGNIFICANT CHANGE UP (ref 1–1.04)
SQUAMOUS # UR AUTO: SIGNIFICANT CHANGE UP
UROBILINOGEN FLD QL: SIGNIFICANT CHANGE UP
WBC UR QL: HIGH (ref 0–?)

## 2018-12-14 PROCEDURE — 99283 EMERGENCY DEPT VISIT LOW MDM: CPT

## 2018-12-14 RX ORDER — ACETAMINOPHEN 500 MG
975 TABLET ORAL ONCE
Qty: 0 | Refills: 0 | Status: COMPLETED | OUTPATIENT
Start: 2018-12-14 | End: 2018-12-14

## 2018-12-14 RX ORDER — METOCLOPRAMIDE HCL 10 MG
10 TABLET ORAL ONCE
Qty: 0 | Refills: 0 | Status: COMPLETED | OUTPATIENT
Start: 2018-12-14 | End: 2018-12-14

## 2018-12-14 RX ORDER — SODIUM CHLORIDE 9 MG/ML
1000 INJECTION, SOLUTION INTRAVENOUS ONCE
Qty: 0 | Refills: 0 | Status: COMPLETED | OUTPATIENT
Start: 2018-12-14 | End: 2018-12-14

## 2018-12-14 NOTE — ED PROVIDER NOTE - OBJECTIVE STATEMENT
29yo F h/o thyroid nodules p/w cc of joint pains    Has had clavicular, L MCP, L hip, L shoulder and cervical spine point tenderness since mid november, unsure what is causing them so decided to come in today. No F/C, denies morning stiffness, denies any N/V/D, any tick bites, is sexually active. No falls/trauma.

## 2018-12-14 NOTE — ED PROVIDER NOTE - MEDICAL DECISION MAKING DETAILS
27yo F h/o thyroid nodules p/w cc of joint pains. Will get UA/Urine G/C, evaluated for similar joint pains in past, needs rheum FU will discuss w/ patient

## 2018-12-14 NOTE — ED PROVIDER NOTE - ATTENDING CONTRIBUTION TO CARE
agree with resident note  28 yr old female with hx of hypothyroidism presents to ED for joint pain, headache, photophobia.  Denies fevers.  Has been seen for similar complaints multiple times in the past.  I spoke to her (saw her on one prior visit) if she has seen rheumatology and she states she has and was diagnosed with fibromyalgia.  Denies fever, excessive working out.    PE: well appearing; VSS: CTAB/L; s1 s2 no m/r/g abd soft/NT/ND ext: no edema Neuro: CNs intact 5/5 motor UE and LE; sensation intact

## 2018-12-14 NOTE — ED PROVIDER NOTE - PHYSICAL EXAMINATION
PHYSICAL EXAM:  GENERAL: NAD, speaks in full sentences, no signs of respiratory distress  HEAD:  Atraumatic, Normocephalic  EYES: EOMI, PERRLA, conjunctiva and sclera clear  NECK: Supple, No JVD  CHEST/LUNG: Clear to auscultation bilaterally; No wheeze; No crackles; No accessory muscles used  HEART: Regular rate and rhythm; No murmurs;   ABDOMEN: Soft, Nontender, Nondistended; Bowel sounds present; No guarding  EXTREMITIES:  tenderness to L hip palpation, L shoulder palpation, 3rd MCP palpation, clavicular and c-spine palpation, no erythema/swelling/stiffness   PSYCH: AAOx3  NEUROLOGY: non-focal  SKIN: No rashes or lesions

## 2018-12-14 NOTE — ED PROVIDER NOTE - PROGRESS NOTE DETAILS
yaneli pgy1: Pt refusing all treatment, refusing blood draws, states she would rather follow up with rheumatology and is leaving, refusing to sign anything. Explained benefits and risks of getting workup now, states "I don't care" Akash: as above; pt walked out

## 2018-12-14 NOTE — ED ADULT TRIAGE NOTE - CHIEF COMPLAINT QUOTE
Pt walk in c/o Pain on Right Collar Bone, Left mid-axillary, Lightheadedness for almost 2 weeks. Been taking Motrin No relief. PMHx Thyroid Nodules. Denies Trauma, CP SOB palpitation N V seating Fever chills.

## 2018-12-16 LAB
N GONORRHOEA RRNA SPEC QL NAA+PROBE: SIGNIFICANT CHANGE UP
SPECIMEN SOURCE: SIGNIFICANT CHANGE UP

## 2018-12-28 ENCOUNTER — EMERGENCY (EMERGENCY)
Facility: HOSPITAL | Age: 28
LOS: 1 days | Discharge: HOME | End: 2018-12-28
Attending: EMERGENCY MEDICINE | Admitting: EMERGENCY MEDICINE
Payer: MEDICAID

## 2018-12-28 VITALS
DIASTOLIC BLOOD PRESSURE: 94 MMHG | RESPIRATION RATE: 18 BRPM | SYSTOLIC BLOOD PRESSURE: 134 MMHG | HEART RATE: 105 BPM | TEMPERATURE: 99 F | OXYGEN SATURATION: 100 %

## 2018-12-28 DIAGNOSIS — Z98.891 HISTORY OF UTERINE SCAR FROM PREVIOUS SURGERY: Chronic | ICD-10-CM

## 2018-12-28 LAB
BASOPHILS # BLD AUTO: 0.04 K/UL — SIGNIFICANT CHANGE UP (ref 0–0.2)
BASOPHILS NFR BLD AUTO: 0.4 % — SIGNIFICANT CHANGE UP (ref 0–2)
EOSINOPHIL # BLD AUTO: 0.08 K/UL — SIGNIFICANT CHANGE UP (ref 0–0.5)
EOSINOPHIL NFR BLD AUTO: 0.8 % — SIGNIFICANT CHANGE UP (ref 0–6)
HCT VFR BLD CALC: 41.4 % — SIGNIFICANT CHANGE UP (ref 34.5–45)
HGB BLD-MCNC: 13.8 G/DL — SIGNIFICANT CHANGE UP (ref 11.5–15.5)
IMM GRANULOCYTES # BLD AUTO: 0.02 # — SIGNIFICANT CHANGE UP
IMM GRANULOCYTES NFR BLD AUTO: 0.2 % — SIGNIFICANT CHANGE UP (ref 0–1.5)
LYMPHOCYTES # BLD AUTO: 3.27 K/UL — SIGNIFICANT CHANGE UP (ref 1–3.3)
LYMPHOCYTES # BLD AUTO: 31.7 % — SIGNIFICANT CHANGE UP (ref 13–44)
MCHC RBC-ENTMCNC: 28.2 PG — SIGNIFICANT CHANGE UP (ref 27–34)
MCHC RBC-ENTMCNC: 33.3 % — SIGNIFICANT CHANGE UP (ref 32–36)
MCV RBC AUTO: 84.5 FL — SIGNIFICANT CHANGE UP (ref 80–100)
MONOCYTES # BLD AUTO: 0.93 K/UL — HIGH (ref 0–0.9)
MONOCYTES NFR BLD AUTO: 9 % — SIGNIFICANT CHANGE UP (ref 2–14)
NEUTROPHILS # BLD AUTO: 5.97 K/UL — SIGNIFICANT CHANGE UP (ref 1.8–7.4)
NEUTROPHILS NFR BLD AUTO: 57.9 % — SIGNIFICANT CHANGE UP (ref 43–77)
NRBC # FLD: 0 — SIGNIFICANT CHANGE UP
PLATELET # BLD AUTO: 320 K/UL — SIGNIFICANT CHANGE UP (ref 150–400)
PMV BLD: 9.4 FL — SIGNIFICANT CHANGE UP (ref 7–13)
RBC # BLD: 4.9 M/UL — SIGNIFICANT CHANGE UP (ref 3.8–5.2)
RBC # FLD: 12.9 % — SIGNIFICANT CHANGE UP (ref 10.3–14.5)
WBC # BLD: 10.31 K/UL — SIGNIFICANT CHANGE UP (ref 3.8–10.5)
WBC # FLD AUTO: 10.31 K/UL — SIGNIFICANT CHANGE UP (ref 3.8–10.5)

## 2018-12-28 PROCEDURE — 71045 X-RAY EXAM CHEST 1 VIEW: CPT | Mod: 26

## 2018-12-28 PROCEDURE — 99283 EMERGENCY DEPT VISIT LOW MDM: CPT

## 2018-12-28 RX ADMIN — Medication 500 MILLIGRAM(S): at 23:15

## 2018-12-28 NOTE — ED ADULT TRIAGE NOTE - CHIEF COMPLAINT QUOTE
pt. 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  Pt. ambulatory in triage.  Denies PMHx.

## 2018-12-29 LAB
ALBUMIN SERPL ELPH-MCNC: 4.5 G/DL — SIGNIFICANT CHANGE UP (ref 3.3–5)
ALP SERPL-CCNC: 71 U/L — SIGNIFICANT CHANGE UP (ref 40–120)
ALT FLD-CCNC: 13 U/L — SIGNIFICANT CHANGE UP (ref 4–33)
APPEARANCE UR: SIGNIFICANT CHANGE UP
AST SERPL-CCNC: 16 U/L — SIGNIFICANT CHANGE UP (ref 4–32)
BACTERIA # UR AUTO: SIGNIFICANT CHANGE UP
BILIRUB SERPL-MCNC: 0.3 MG/DL — SIGNIFICANT CHANGE UP (ref 0.2–1.2)
BILIRUB UR-MCNC: NEGATIVE — SIGNIFICANT CHANGE UP
BLOOD UR QL VISUAL: NEGATIVE — SIGNIFICANT CHANGE UP
BUN SERPL-MCNC: 11 MG/DL — SIGNIFICANT CHANGE UP (ref 7–23)
CALCIUM SERPL-MCNC: 9.5 MG/DL — SIGNIFICANT CHANGE UP (ref 8.4–10.5)
CHLORIDE SERPL-SCNC: 102 MMOL/L — SIGNIFICANT CHANGE UP (ref 98–107)
CK SERPL-CCNC: 73 U/L — SIGNIFICANT CHANGE UP (ref 25–170)
CO2 SERPL-SCNC: 23 MMOL/L — SIGNIFICANT CHANGE UP (ref 22–31)
COLOR SPEC: YELLOW — SIGNIFICANT CHANGE UP
CREAT SERPL-MCNC: 0.66 MG/DL — SIGNIFICANT CHANGE UP (ref 0.5–1.3)
CRP SERPL-MCNC: < 4 MG/L — SIGNIFICANT CHANGE UP
ERYTHROCYTE [SEDIMENTATION RATE] IN BLOOD: 16 MM/HR — SIGNIFICANT CHANGE UP (ref 4–25)
GLUCOSE SERPL-MCNC: 82 MG/DL — SIGNIFICANT CHANGE UP (ref 70–99)
GLUCOSE UR-MCNC: NEGATIVE — SIGNIFICANT CHANGE UP
HIV COMBO RESULT: SIGNIFICANT CHANGE UP
HIV1+2 AB SPEC QL: SIGNIFICANT CHANGE UP
KETONES UR-MCNC: NEGATIVE — SIGNIFICANT CHANGE UP
LEUKOCYTE ESTERASE UR-ACNC: NEGATIVE — SIGNIFICANT CHANGE UP
NITRITE UR-MCNC: NEGATIVE — SIGNIFICANT CHANGE UP
PH UR: 7 — SIGNIFICANT CHANGE UP (ref 5–8)
POTASSIUM SERPL-MCNC: 4.1 MMOL/L — SIGNIFICANT CHANGE UP (ref 3.5–5.3)
POTASSIUM SERPL-SCNC: 4.1 MMOL/L — SIGNIFICANT CHANGE UP (ref 3.5–5.3)
PROT SERPL-MCNC: 7.4 G/DL — SIGNIFICANT CHANGE UP (ref 6–8.3)
PROT UR-MCNC: 20 — SIGNIFICANT CHANGE UP
RBC CASTS # UR COMP ASSIST: SIGNIFICANT CHANGE UP (ref 0–?)
SODIUM SERPL-SCNC: 139 MMOL/L — SIGNIFICANT CHANGE UP (ref 135–145)
SP GR SPEC: 1.03 — SIGNIFICANT CHANGE UP (ref 1–1.04)
SQUAMOUS # UR AUTO: SIGNIFICANT CHANGE UP
TSH SERPL-MCNC: 3.23 UIU/ML — SIGNIFICANT CHANGE UP (ref 0.27–4.2)
UROBILINOGEN FLD QL: NORMAL — SIGNIFICANT CHANGE UP
WBC UR QL: SIGNIFICANT CHANGE UP (ref 0–?)

## 2018-12-29 RX ADMIN — Medication 500 MILLIGRAM(S): at 01:42

## 2018-12-29 NOTE — ED PROVIDER NOTE - PROGRESS NOTE DETAILS
Feeling somehwat better, but mild pain still present.  States she can f/u with her PMD this week.  Results reviewed and provided.

## 2018-12-29 NOTE — ED PROVIDER NOTE - MUSCULOSKELETAL MINIMAL EXAM
diffuse muscle tenderness throughout entire back, upper extremities, and lower extremities primarily proximal muscles

## 2018-12-29 NOTE — ED PROVIDER NOTE - NSFOLLOWUPINSTRUCTIONS_ED_ALL_ED_FT
A cause for your pain was not found.  Please see your doctor for follow up since you may need more testing. One of today's blood tests will not be resulted until Sunday or Monday. This test is called the JAVAD.  You may call [phone] for results during normal business hours.     Take Ibuprofen or Aleve as needed for pain.  Make sure to keep drinking and eating normally.     Return to the ER for rashes, fevers, uncontrolled pain, or any other concerning signs.

## 2018-12-29 NOTE — ED PROVIDER NOTE - OBJECTIVE STATEMENT
29 y/o F w/ noncontributory PMHx p/w several weeks of progressive diffuse muscle pain especially proximal muscles as well as back pain, paresthesias to upper extremities. Denies weight loss, skin changes, hair changes, nausea, vomiting, diarrhea, fevers, chills. Anxious appearing. Denies drugs or EtOH. No recent travel or sick contacts. No CP, no SOB.

## 2018-12-29 NOTE — ED PROVIDER NOTE - MEDICAL DECISION MAKING DETAILS
29 y/o F w/ diffuse pains, also endorses transient arthritides, would consider polymyositis vs RA vs SLE vs other rheum. Plan - will send labs, likely refer for PMD or Rheum f/u.

## 2018-12-30 LAB
BACTERIA UR CULT: SIGNIFICANT CHANGE UP
SPECIMEN SOURCE: SIGNIFICANT CHANGE UP

## 2019-01-03 LAB — ANA TITR SER: NEGATIVE — SIGNIFICANT CHANGE UP

## 2019-02-10 NOTE — ED PROVIDER NOTE - ADNEXA
Full range of motion of upper and lower extremities, no joint tenderness/swelling. no tenderness bilaterally

## 2019-05-19 ENCOUNTER — EMERGENCY (EMERGENCY)
Facility: HOSPITAL | Age: 29
LOS: 1 days | Discharge: HOME | End: 2019-05-19
Attending: EMERGENCY MEDICINE | Admitting: EMERGENCY MEDICINE
Payer: MEDICAID

## 2019-05-19 VITALS
RESPIRATION RATE: 17 BRPM | SYSTOLIC BLOOD PRESSURE: 123 MMHG | OXYGEN SATURATION: 100 % | HEART RATE: 100 BPM | DIASTOLIC BLOOD PRESSURE: 85 MMHG | TEMPERATURE: 98 F

## 2019-05-19 DIAGNOSIS — Z98.891 HISTORY OF UTERINE SCAR FROM PREVIOUS SURGERY: Chronic | ICD-10-CM

## 2019-05-19 PROCEDURE — 99283 EMERGENCY DEPT VISIT LOW MDM: CPT

## 2019-05-19 NOTE — ED PROVIDER NOTE - NSFOLLOWUPINSTRUCTIONS_ED_ALL_ED_FT
1. Follow up with your primary care physician within 2-3days for reevaluation.  2.  Return to the Emergency Department for worsening, progressive or any other concerning symptoms.   3.  Please take Motrin 600mg by mouth every 6 hours as needed for pain. Please take this medication with food.   4.  Please take tylenol 650 mg every 4 hours as needed for pain. Please do not exceed more than 4,000mg of Tylenol in a day

## 2019-05-19 NOTE — ED PROVIDER NOTE - CHPI ED SYMPTOMS POS
HEADACHE/+Back pain, +throat pain, +Nasal congestion, +double vision +Back pain, +throat pain, +Nasal congestion,/HEADACHE

## 2019-05-19 NOTE — ED PROVIDER NOTE - OBJECTIVE STATEMENT
29 y/o F w/ no pertinent PMH, presents to ED c/o headache, gradual in onset x6 days. Pt states over the past 6 days, she endorsed a headache, which was initially intermittent, but is now constant. Pt further notes since yesterday she started to endorse upper back pain, and started to have throat pain w/ nasal congestion and double vision. Pt has been taking OTC medication w/ no relief. Denies any vomiting, fever, rash, or any other acute complaints. NKDA. 29 y/o F w/ no pertinent PMH, presents to ED c/o headache, gradual in onset x6 days. Pt states over the past 6 days, she endorsed a headache, which was initially intermittent, but is now constant. Pt further notes since yesterday she started to endorse upper back pain, and started to have throat pain w/ nasal congestion. Pt has been taking OTC medication w/ no relief. Denies any vomiting, fever, rash, or any other acute complaints. NKDA.

## 2019-05-19 NOTE — ED ADULT TRIAGE NOTE - CHIEF COMPLAINT QUOTE
Pt c/o headache x5 days. Pt states while at work on Friday pt began to feel lightheaded because of HA. Pt denies hx of migraines. Pt endorses intermittent nausea and double vision.  Pt denies cp/ sob/ fevers/ vomiting

## 2019-05-19 NOTE — ED PROVIDER NOTE - ATTENDING CONTRIBUTION TO CARE
I performed the initial face to face bedside interview with this patient regarding history of present illness, review of symptoms and past medical, social and family history.  I completed an independent physical examination.  I was the initial provider who evaluated this patient.  I have signed out the follow up of any pending tests (i.e. labs, radiological studies) to the resident.  I have discussed the patient’s plan of care and disposition with the resident.

## 2019-05-19 NOTE — ED PROVIDER NOTE - CLINICAL SUMMARY MEDICAL DECISION MAKING FREE TEXT BOX
27yo female with frontal headache and congestion, neck stiffness with reproducible tenderness on exam likely sinusitis vs tension headache. Unlikely ICH given length of symptoms and gradual onset. Unlikely meningitis given lack of fever, length of symptoms, and lack of nuchal rigidity. Patient offered treatment with antibiotics but declined, would like to be discharged home. WIll DC home. Gege Beal DO

## 2019-05-19 NOTE — ED PROVIDER NOTE - NEUROLOGICAL, MLM
Alert and oriented, no focal deficits, no motor or sensory deficits. Cranial clearance 2-12 intact. No dysmetria upon finger to nose testing. Strength 5/5 in all 4 extremities and sensation intact throughout. No Nuchal rigidity

## 2019-05-28 ENCOUNTER — EMERGENCY (EMERGENCY)
Facility: HOSPITAL | Age: 29
LOS: 0 days | Discharge: HOME | End: 2019-05-28
Payer: COMMERCIAL

## 2019-05-28 VITALS
TEMPERATURE: 98 F | DIASTOLIC BLOOD PRESSURE: 73 MMHG | HEART RATE: 80 BPM | OXYGEN SATURATION: 99 % | HEIGHT: 61 IN | SYSTOLIC BLOOD PRESSURE: 104 MMHG | RESPIRATION RATE: 19 BRPM

## 2019-05-28 DIAGNOSIS — M54.5 LOW BACK PAIN: ICD-10-CM

## 2019-05-28 DIAGNOSIS — R51 HEADACHE: ICD-10-CM
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
## 2019-05-28 DIAGNOSIS — Z98.891 HISTORY OF UTERINE SCAR FROM PREVIOUS SURGERY: Chronic | ICD-10-CM
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
## 2019-05-28 PROCEDURE — 70450 CT HEAD/BRAIN W/O DYE: CPT | Mod: 26

## 2019-05-28 PROCEDURE — 99283 EMERGENCY DEPT VISIT LOW MDM: CPT

## 2019-05-28 RX ORDER — ACETAMINOPHEN 500 MG
2 TABLET ORAL
Qty: 20 | Refills: 0
Start: 2019-05-28

## 2019-05-28 RX ORDER — CYCLOBENZAPRINE HYDROCHLORIDE 10 MG/1
1 TABLET, FILM COATED ORAL
Qty: 9 | Refills: 0
Start: 2019-05-28

## 2019-05-28 RX ORDER — ACETAMINOPHEN 500 MG
650 TABLET ORAL ONCE
Refills: 0 | Status: COMPLETED | OUTPATIENT
Start: 2019-05-28 | End: 2019-05-28

## 2019-05-28 RX ADMIN — Medication 650 MILLIGRAM(S): at 19:38

## 2019-05-28 NOTE — ED PROVIDER NOTE - CLINICAL SUMMARY MEDICAL DECISION MAKING FREE TEXT BOX
ct with maxiallry sinusitis but otherwise neg for bleed or mass, will give abx, pt is a and ox 3,. gcs 15 no red flags, neuro exam unremarkable, pt is ambulatory in ed without complications, vss, afebrile, pt states feels better after meds. provided ortho spine fu, return precautions given, ok with dc

## 2019-05-28 NOTE — ED ADULT NURSE NOTE - NSIMPLEMENTINTERV_GEN_ALL_ED
Implemented All Universal Safety Interventions:  [place] to call system. Call bell, personal items and telephone within reach. Instruct patient to call for assistance. Room bathroom lighting operational. Non-slip footwear when patient is off stretcher. Physically safe environment: no spills, clutter or unnecessary equipment. Stretcher in lowest position, wheels locked, appropriate side rails in place.
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
## 2019-05-28 NOTE — ED ADULT NURSE NOTE - DISCHARGE DATE/TIME
Chronic health problem, stable.  And is by pulmonology.  Patient has upcoming appointment with pulmonology on 4/22/19.  Uses supplemental oxygen.  Uses Symbicort inhaler.  Reports Atrovent inhaler leads a bad taste in her mouth.  
Chronic health problem, well-controlled with duloxetine 60 mg daily.  Patient reports today is a particularly hard day though has this is the anniversary of her son's death 12 years ago.  Patient reports typically she feels more positive.  Patient does try to socialize with friends, though it is difficult that she has chronic pain and has a hard time getting around.  She is looking into assisted living so she can have a more social atmosphere.  Denies SI/HI.    
Chronic health problem.  Stable.  Updated labs are needed.  Managed by nephrology.  Patient has appointment with nephrology on 4/30/19.  
Chronic problem.  Stable.  Uses Lantus at bedtime.  Last hemoglobin A1c is 6.  
Patient has chronic pain from arthritis and spondylosis.  She presents today for refill of oxycodone.  She takes 10 mg 3 times a day.  She has been unable to wean down further as pain continues.  Patient also uses a massager with heat, ice with some relief.  Pain is aggravated by ambulation.  Laying flat is most comfortable.    Patient also takes Flexeril, Cymbalta, Elavil, Tylenol.  She does not drink alcohol.   and UDS reviewed, no concerns.  Reviewed risks of opioids with patient including respiratory depression.    
Stable peer patient uses supplemental oxygen.  Has COPD.  Followed by pulmonology.  
28-May-2019 20:02
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
## 2019-05-28 NOTE — ED ADULT NURSE NOTE - CHPI ED NUR SYMPTOMS NEG
no fussiness/no laceration/no decreased eating/drinking/no difficulty bearing weight/no acting out behaviors/no sleeping issues/no disorientation/no dizziness/no loss of consciousness/no crying/no bruising

## 2019-05-28 NOTE — ED PROVIDER NOTE - PHYSICAL EXAMINATION
Gen: Alert, NAD, well appearing  Head: NC, AT, PERRL, EOMI, normal lids/conjunctiva  ENT: B TM WNL, normal hearing, patent oropharynx without erythema/exudate, uvula midline  Neck: +supple, no tenderness/meningismus/JVD, +Trachea midline  Pulm: Bilateral BS, normal resp effort, no wheeze/stridor/retractions  CV: RRR, no M/R/G, +dist pulses  Abd: soft, NT/ND, +BS, no hepatosplenomegaly  Mskel: no edema/erythema/cyanosis no saddle anesthesia, no spinal tenderness ctls, neg slr, str 5/5 x4 bl, sensations intact, gait ok  Skin: no rash  Neuro: AAOx3, no sensory/motor deficits, CN 2-12 intact
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
## 2019-06-28 ENCOUNTER — EMERGENCY (EMERGENCY)
Facility: HOSPITAL | Age: 29
LOS: 1 days | Discharge: HOME | End: 2019-06-28
Attending: EMERGENCY MEDICINE | Admitting: EMERGENCY MEDICINE
Payer: MEDICAID

## 2019-06-28 VITALS
TEMPERATURE: 98 F | RESPIRATION RATE: 18 BRPM | OXYGEN SATURATION: 100 % | HEART RATE: 94 BPM | SYSTOLIC BLOOD PRESSURE: 116 MMHG | DIASTOLIC BLOOD PRESSURE: 70 MMHG

## 2019-06-28 DIAGNOSIS — Z98.891 HISTORY OF UTERINE SCAR FROM PREVIOUS SURGERY: Chronic | ICD-10-CM

## 2019-06-28 LAB
ALBUMIN SERPL ELPH-MCNC: 4.6 G/DL — SIGNIFICANT CHANGE UP (ref 3.3–5)
ALP SERPL-CCNC: 70 U/L — SIGNIFICANT CHANGE UP (ref 40–120)
ALT FLD-CCNC: 10 U/L — SIGNIFICANT CHANGE UP (ref 4–33)
ANION GAP SERPL CALC-SCNC: 11 MMO/L — SIGNIFICANT CHANGE UP (ref 7–14)
APPEARANCE UR: SIGNIFICANT CHANGE UP
AST SERPL-CCNC: 16 U/L — SIGNIFICANT CHANGE UP (ref 4–32)
BACTERIA # UR AUTO: SIGNIFICANT CHANGE UP
BASOPHILS # BLD AUTO: 0.06 K/UL — SIGNIFICANT CHANGE UP (ref 0–0.2)
BASOPHILS NFR BLD AUTO: 0.7 % — SIGNIFICANT CHANGE UP (ref 0–2)
BILIRUB SERPL-MCNC: 0.3 MG/DL — SIGNIFICANT CHANGE UP (ref 0.2–1.2)
BILIRUB UR-MCNC: NEGATIVE — SIGNIFICANT CHANGE UP
BLOOD UR QL VISUAL: NEGATIVE — SIGNIFICANT CHANGE UP
BUN SERPL-MCNC: 10 MG/DL — SIGNIFICANT CHANGE UP (ref 7–23)
CALCIUM SERPL-MCNC: 9.7 MG/DL — SIGNIFICANT CHANGE UP (ref 8.4–10.5)
CHLORIDE SERPL-SCNC: 102 MMOL/L — SIGNIFICANT CHANGE UP (ref 98–107)
CO2 SERPL-SCNC: 24 MMOL/L — SIGNIFICANT CHANGE UP (ref 22–31)
COLOR SPEC: YELLOW — SIGNIFICANT CHANGE UP
CREAT SERPL-MCNC: 0.69 MG/DL — SIGNIFICANT CHANGE UP (ref 0.5–1.3)
EOSINOPHIL # BLD AUTO: 0.1 K/UL — SIGNIFICANT CHANGE UP (ref 0–0.5)
EOSINOPHIL NFR BLD AUTO: 1.1 % — SIGNIFICANT CHANGE UP (ref 0–6)
GLUCOSE SERPL-MCNC: 83 MG/DL — SIGNIFICANT CHANGE UP (ref 70–99)
GLUCOSE UR-MCNC: NEGATIVE — SIGNIFICANT CHANGE UP
HCT VFR BLD CALC: 40.7 % — SIGNIFICANT CHANGE UP (ref 34.5–45)
HGB BLD-MCNC: 13.7 G/DL — SIGNIFICANT CHANGE UP (ref 11.5–15.5)
HYALINE CASTS # UR AUTO: NEGATIVE — SIGNIFICANT CHANGE UP
IMM GRANULOCYTES NFR BLD AUTO: 0.2 % — SIGNIFICANT CHANGE UP (ref 0–1.5)
KETONES UR-MCNC: NEGATIVE — SIGNIFICANT CHANGE UP
LEUKOCYTE ESTERASE UR-ACNC: NEGATIVE — SIGNIFICANT CHANGE UP
LYMPHOCYTES # BLD AUTO: 2.85 K/UL — SIGNIFICANT CHANGE UP (ref 1–3.3)
LYMPHOCYTES # BLD AUTO: 31.1 % — SIGNIFICANT CHANGE UP (ref 13–44)
MCHC RBC-ENTMCNC: 28.5 PG — SIGNIFICANT CHANGE UP (ref 27–34)
MCHC RBC-ENTMCNC: 33.7 % — SIGNIFICANT CHANGE UP (ref 32–36)
MCV RBC AUTO: 84.6 FL — SIGNIFICANT CHANGE UP (ref 80–100)
MONOCYTES # BLD AUTO: 0.86 K/UL — SIGNIFICANT CHANGE UP (ref 0–0.9)
MONOCYTES NFR BLD AUTO: 9.4 % — SIGNIFICANT CHANGE UP (ref 2–14)
NEUTROPHILS # BLD AUTO: 5.27 K/UL — SIGNIFICANT CHANGE UP (ref 1.8–7.4)
NEUTROPHILS NFR BLD AUTO: 57.5 % — SIGNIFICANT CHANGE UP (ref 43–77)
NITRITE UR-MCNC: NEGATIVE — SIGNIFICANT CHANGE UP
NRBC # FLD: 0 K/UL — SIGNIFICANT CHANGE UP (ref 0–0)
PH UR: 7.5 — SIGNIFICANT CHANGE UP (ref 5–8)
PLATELET # BLD AUTO: 293 K/UL — SIGNIFICANT CHANGE UP (ref 150–400)
PMV BLD: 9.4 FL — SIGNIFICANT CHANGE UP (ref 7–13)
POTASSIUM SERPL-MCNC: 4 MMOL/L — SIGNIFICANT CHANGE UP (ref 3.5–5.3)
POTASSIUM SERPL-SCNC: 4 MMOL/L — SIGNIFICANT CHANGE UP (ref 3.5–5.3)
PROT SERPL-MCNC: 7.7 G/DL — SIGNIFICANT CHANGE UP (ref 6–8.3)
PROT UR-MCNC: 10 — SIGNIFICANT CHANGE UP
RBC # BLD: 4.81 M/UL — SIGNIFICANT CHANGE UP (ref 3.8–5.2)
RBC # FLD: 13.1 % — SIGNIFICANT CHANGE UP (ref 10.3–14.5)
RBC CASTS # UR COMP ASSIST: SIGNIFICANT CHANGE UP (ref 0–?)
SODIUM SERPL-SCNC: 137 MMOL/L — SIGNIFICANT CHANGE UP (ref 135–145)
SP GR SPEC: 1.02 — SIGNIFICANT CHANGE UP (ref 1–1.04)
SQUAMOUS # UR AUTO: SIGNIFICANT CHANGE UP
UROBILINOGEN FLD QL: NORMAL — SIGNIFICANT CHANGE UP
WBC # BLD: 9.16 K/UL — SIGNIFICANT CHANGE UP (ref 3.8–10.5)
WBC # FLD AUTO: 9.16 K/UL — SIGNIFICANT CHANGE UP (ref 3.8–10.5)
WBC UR QL: SIGNIFICANT CHANGE UP (ref 0–?)

## 2019-06-28 PROCEDURE — 99283 EMERGENCY DEPT VISIT LOW MDM: CPT

## 2019-06-28 RX ORDER — SODIUM CHLORIDE 9 MG/ML
1000 INJECTION INTRAMUSCULAR; INTRAVENOUS; SUBCUTANEOUS ONCE
Refills: 0 | Status: COMPLETED | OUTPATIENT
Start: 2019-06-28 | End: 2019-06-28

## 2019-06-28 RX ORDER — MECLIZINE HCL 12.5 MG
25 TABLET ORAL ONCE
Refills: 0 | Status: COMPLETED | OUTPATIENT
Start: 2019-06-28 | End: 2019-06-28

## 2019-06-28 RX ORDER — ACETAMINOPHEN 500 MG
650 TABLET ORAL ONCE
Refills: 0 | Status: COMPLETED | OUTPATIENT
Start: 2019-06-28 | End: 2019-06-28

## 2019-06-28 RX ADMIN — SODIUM CHLORIDE 2000 MILLILITER(S): 9 INJECTION INTRAMUSCULAR; INTRAVENOUS; SUBCUTANEOUS at 15:30

## 2019-06-28 RX ADMIN — Medication 650 MILLIGRAM(S): at 15:31

## 2019-06-28 NOTE — ED PROVIDER NOTE - CLINICAL SUMMARY MEDICAL DECISION MAKING FREE TEXT BOX
28 year old female with dizziness, likely peripheral vertigo, neuro exam with cerebellar testing normal. Neck pain with radiculopathy, no clinical evidence of compression cord syndrome. will do labs, treat symptomatically, and Reassess. 28 year old female with dizziness, likely peripheral vertigo, neuro exam with cerebellar testing normal. Neck pain with radiculopathy, no clinical evidence of compression cord syndrome. Left occipital lump, likely lymphadenopathy. will do labs, treat symptomatically, will also do Head CT upon pt's request although there is no clinical evidence to suggest a central origin of dizziness and Reassess. 28 year old female with dizziness, likely peripheral vertigo, neuro exam with cerebellar testing normal. Neck pain with radiculopathy, no clinical evidence of compression cord syndrome. Left occipital lump, likely lymphadenopathy. will do labs, treat symptomatically, will also do Head CT upon pt's request although there is no clinical evidence to suggest a central origin of dizziness at this time. Reassess.

## 2019-06-28 NOTE — ED ADULT NURSE NOTE - OBJECTIVE STATEMENT
27 yo female, ambulatory, c/o of a lump to lower left side of head that appeared on Wednesday of this week. since appearing, pt reports pain in left neck, jaw, shoulder, upper left chest region. pt reports intermittent headache/dizziness. pt also reports numbness, tingling to left arm, left leg. no facial droop present, equal  strength. pt denies sob, abdominal pain, n/v, dysuria, constipation. 20g iv insert to left ac, ns infusing well.

## 2019-06-28 NOTE — ED PROVIDER NOTE - PROGRESS NOTE DETAILS
MARVIN Alonzo: Labs and imaging studies reviewed, no acute findings. Pt reassessed; reports feeling better. will dispo home with strict return precautions and PMD follow up in 1-2 days.

## 2019-06-28 NOTE — ED PROVIDER NOTE - ATTENDING CONTRIBUTION TO CARE
28 year old female c/o neck pain and transient dizziness x few days. PE: NAD, cervical non-tender, neuro exam WNL. I&P: labs WNL, cervical strain, analgesics, rest, neuro follow up

## 2019-06-28 NOTE — ED PROVIDER NOTE - OBJECTIVE STATEMENT
28 year old female with no known PMH presents to the ED complaining of pain to neck radiating to the left shoulder for 3 days, severity 4/10, worse with movement, no relieving factors. Pt also complains of dizziness, states she feels like she's spinning with the room at times and also experiences lightheadedness with increased fatigue. She mentioned that she noted a painful lump to left occipital also since Wednesday. Pt denies headache, weakness, numbness, tingling sensation, neck stiffness, photophobia, nausea, vomiting, fever, chills, chest pain, dyspnea and abdominal pain. Pt denies any other complains.

## 2019-06-28 NOTE — ED ADULT TRIAGE NOTE - CHIEF COMPLAINT QUOTE
Pt states she had pain to left side of neck and back of head for the past week, states pain also radiates to jaw (worse when chewing), to left shoulder, and left chest. Pt denies SOB. Pt also states she noted a tender lump to her scalp on the left side of head. For the past few days has been feeling dizzy, lightheaded, off balance.

## 2019-06-28 NOTE — ED PROVIDER NOTE - NSFOLLOWUPINSTRUCTIONS_ED_ALL_ED_FT
Please follow up with your primary care doctor in 1-2 days.  Return to the emergency department if you have any new, worsened or concerning symptoms.

## 2020-01-01 NOTE — ED PROVIDER NOTE - CONDITION AT DISCHARGE:
[FreeTextEntry1] : Recommend exclusive breastfeeding, 8 -12 feedings per day. Mother should continue prenatal vitamins and avoid alcohol. If formula is needed, recommend iron-fortified formulations every 2-3 hrs. When in car, patient should be in rear-facing car seat in back seat. Air dry umbilical stump. Put baby to sleep on back, in own crib with no loose or soft bedding. Limit baby's exposure to others, especially those with fever or unknown vaccine status. Advised vitamin D or Tri-Vi-Sol PO daily if nursing.\par  \par No Jaundice noted on exam today\par Advised to continue feeding adequately, supplement with Formula if breast milk is not enough\par Monitor for adequate urine output and stooling\par Can expose patient to indirect sunlight\par RTC or to ER if worsening jaundice, fever, AMS, lethargy, decreased feeding, decreased UOP, or SOB \par \par RTC for 1 month old WCC and vaccine Improved

## 2020-01-22 ENCOUNTER — EMERGENCY (EMERGENCY)
Facility: HOSPITAL | Age: 30
LOS: 1 days | Discharge: HOME | End: 2020-01-22
Payer: MEDICAID

## 2020-01-22 VITALS
DIASTOLIC BLOOD PRESSURE: 63 MMHG | RESPIRATION RATE: 16 BRPM | OXYGEN SATURATION: 99 % | SYSTOLIC BLOOD PRESSURE: 99 MMHG | HEART RATE: 83 BPM | TEMPERATURE: 99 F

## 2020-01-22 VITALS
DIASTOLIC BLOOD PRESSURE: 57 MMHG | RESPIRATION RATE: 16 BRPM | HEART RATE: 81 BPM | OXYGEN SATURATION: 100 % | TEMPERATURE: 98 F | SYSTOLIC BLOOD PRESSURE: 94 MMHG

## 2020-01-22 DIAGNOSIS — Z98.891 HISTORY OF UTERINE SCAR FROM PREVIOUS SURGERY: Chronic | ICD-10-CM

## 2020-01-22 LAB
ALBUMIN SERPL ELPH-MCNC: 4.8 G/DL — SIGNIFICANT CHANGE UP (ref 3.3–5)
ALP SERPL-CCNC: 65 U/L — SIGNIFICANT CHANGE UP (ref 40–120)
ALT FLD-CCNC: 11 U/L — SIGNIFICANT CHANGE UP (ref 4–33)
ANION GAP SERPL CALC-SCNC: 13 MMO/L — SIGNIFICANT CHANGE UP (ref 7–14)
APPEARANCE UR: SIGNIFICANT CHANGE UP
AST SERPL-CCNC: 14 U/L — SIGNIFICANT CHANGE UP (ref 4–32)
BACTERIA # UR AUTO: SIGNIFICANT CHANGE UP
BASOPHILS # BLD AUTO: 0.09 K/UL — SIGNIFICANT CHANGE UP (ref 0–0.2)
BASOPHILS NFR BLD AUTO: 0.9 % — SIGNIFICANT CHANGE UP (ref 0–2)
BILIRUB SERPL-MCNC: < 0.2 MG/DL — LOW (ref 0.2–1.2)
BILIRUB UR-MCNC: NEGATIVE — SIGNIFICANT CHANGE UP
BLOOD UR QL VISUAL: NEGATIVE — SIGNIFICANT CHANGE UP
BUN SERPL-MCNC: 11 MG/DL — SIGNIFICANT CHANGE UP (ref 7–23)
CALCIUM SERPL-MCNC: 9.5 MG/DL — SIGNIFICANT CHANGE UP (ref 8.4–10.5)
CHLORIDE SERPL-SCNC: 101 MMOL/L — SIGNIFICANT CHANGE UP (ref 98–107)
CO2 SERPL-SCNC: 25 MMOL/L — SIGNIFICANT CHANGE UP (ref 22–31)
COLOR SPEC: YELLOW — SIGNIFICANT CHANGE UP
CREAT SERPL-MCNC: 0.78 MG/DL — SIGNIFICANT CHANGE UP (ref 0.5–1.3)
EOSINOPHIL # BLD AUTO: 0.1 K/UL — SIGNIFICANT CHANGE UP (ref 0–0.5)
EOSINOPHIL NFR BLD AUTO: 1 % — SIGNIFICANT CHANGE UP (ref 0–6)
GLUCOSE SERPL-MCNC: 81 MG/DL — SIGNIFICANT CHANGE UP (ref 70–99)
GLUCOSE UR-MCNC: NEGATIVE — SIGNIFICANT CHANGE UP
HCT VFR BLD CALC: 41.9 % — SIGNIFICANT CHANGE UP (ref 34.5–45)
HGB BLD-MCNC: 14 G/DL — SIGNIFICANT CHANGE UP (ref 11.5–15.5)
IMM GRANULOCYTES NFR BLD AUTO: 0.2 % — SIGNIFICANT CHANGE UP (ref 0–1.5)
KETONES UR-MCNC: NEGATIVE — SIGNIFICANT CHANGE UP
LEUKOCYTE ESTERASE UR-ACNC: NEGATIVE — SIGNIFICANT CHANGE UP
LIDOCAIN IGE QN: 46 U/L — SIGNIFICANT CHANGE UP (ref 7–60)
LYMPHOCYTES # BLD AUTO: 3.69 K/UL — HIGH (ref 1–3.3)
LYMPHOCYTES # BLD AUTO: 37.4 % — SIGNIFICANT CHANGE UP (ref 13–44)
MCHC RBC-ENTMCNC: 28.7 PG — SIGNIFICANT CHANGE UP (ref 27–34)
MCHC RBC-ENTMCNC: 33.4 % — SIGNIFICANT CHANGE UP (ref 32–36)
MCV RBC AUTO: 86 FL — SIGNIFICANT CHANGE UP (ref 80–100)
MONOCYTES # BLD AUTO: 0.88 K/UL — SIGNIFICANT CHANGE UP (ref 0–0.9)
MONOCYTES NFR BLD AUTO: 8.9 % — SIGNIFICANT CHANGE UP (ref 2–14)
NEUTROPHILS # BLD AUTO: 5.08 K/UL — SIGNIFICANT CHANGE UP (ref 1.8–7.4)
NEUTROPHILS NFR BLD AUTO: 51.6 % — SIGNIFICANT CHANGE UP (ref 43–77)
NITRITE UR-MCNC: NEGATIVE — SIGNIFICANT CHANGE UP
NRBC # FLD: 0 K/UL — SIGNIFICANT CHANGE UP (ref 0–0)
PH UR: 7.5 — SIGNIFICANT CHANGE UP (ref 5–8)
PLATELET # BLD AUTO: 283 K/UL — SIGNIFICANT CHANGE UP (ref 150–400)
PMV BLD: 9.6 FL — SIGNIFICANT CHANGE UP (ref 7–13)
POTASSIUM SERPL-MCNC: 4.3 MMOL/L — SIGNIFICANT CHANGE UP (ref 3.5–5.3)
POTASSIUM SERPL-SCNC: 4.3 MMOL/L — SIGNIFICANT CHANGE UP (ref 3.5–5.3)
PROT SERPL-MCNC: 7.8 G/DL — SIGNIFICANT CHANGE UP (ref 6–8.3)
PROT UR-MCNC: 30 — SIGNIFICANT CHANGE UP
RBC # BLD: 4.87 M/UL — SIGNIFICANT CHANGE UP (ref 3.8–5.2)
RBC # FLD: 12.5 % — SIGNIFICANT CHANGE UP (ref 10.3–14.5)
RBC CASTS # UR COMP ASSIST: SIGNIFICANT CHANGE UP (ref 0–?)
SODIUM SERPL-SCNC: 139 MMOL/L — SIGNIFICANT CHANGE UP (ref 135–145)
SP GR SPEC: 1.03 — SIGNIFICANT CHANGE UP (ref 1–1.04)
SQUAMOUS # UR AUTO: SIGNIFICANT CHANGE UP
UROBILINOGEN FLD QL: NORMAL — SIGNIFICANT CHANGE UP
WBC # BLD: 9.86 K/UL — SIGNIFICANT CHANGE UP (ref 3.8–10.5)
WBC # FLD AUTO: 9.86 K/UL — SIGNIFICANT CHANGE UP (ref 3.8–10.5)
WBC UR QL: SIGNIFICANT CHANGE UP (ref 0–?)

## 2020-01-22 PROCEDURE — 74176 CT ABD & PELVIS W/O CONTRAST: CPT | Mod: 26,GC

## 2020-01-22 PROCEDURE — 76705 ECHO EXAM OF ABDOMEN: CPT | Mod: 26

## 2020-01-22 PROCEDURE — 99284 EMERGENCY DEPT VISIT MOD MDM: CPT

## 2020-01-22 RX ORDER — SODIUM CHLORIDE 9 MG/ML
1000 INJECTION INTRAMUSCULAR; INTRAVENOUS; SUBCUTANEOUS ONCE
Refills: 0 | Status: COMPLETED | OUTPATIENT
Start: 2020-01-22 | End: 2020-01-22

## 2020-01-22 RX ADMIN — SODIUM CHLORIDE 1000 MILLILITER(S): 9 INJECTION INTRAMUSCULAR; INTRAVENOUS; SUBCUTANEOUS at 20:35

## 2020-01-22 NOTE — ED PROVIDER NOTE - CLINICAL SUMMARY MEDICAL DECISION MAKING FREE TEXT BOX
29yof with abdominal pain and increased urinary frequency, Will get labs, CT, IV fluids and pain control

## 2020-01-22 NOTE — ED PROVIDER NOTE - PATIENT PORTAL LINK FT
You can access the FollowMyHealth Patient Portal offered by [facility] by registering at the following website: [email or web address]. By joining [facility]’s FollowMyHealth portal, you will also be able to view your health information using other applications (apps) compatible with our system.

## 2020-01-22 NOTE — ED PROVIDER NOTE - NSFOLLOWUPINSTRUCTIONS_ED_ALL_ED_FT
Rest, drink plenty of fluids.  Advance activity as tolerated. Take Cefpodoxime 200mg twice a day for 10 days, finish this antibiotic. Take Tylenol 650mg (Two 325 mg pills) every 4-6 hours as needed for pain. Take Motrin 600 mg every 4-6 hours as needed for moderate pain -- take with food.  Follow up with your primary care physician in 48-72 hours- bring copies of your results.  Return to the ER for worsening or persistent symptoms, and/or ANY NEW OR CONCERNING SYMPTOMS. If you have issues obtaining follow up, please call: [phone] to obtain a doctor or specialist who takes your insurance in your area.

## 2020-01-22 NOTE — ED PROVIDER NOTE - OBJECTIVE STATEMENT
29yof with pmhx of  otherwise healthy, presents to ED c/o RUQ abdominal pain for 2 days. Associated sx include increased urinary frequency, darker colored urine. No associated to food intake. Pain radiates to R flank. Pt denies fevers, chills, chest pains, SOB, vomiting, sick contacts.

## 2020-01-22 NOTE — ED PROVIDER NOTE - PROGRESS NOTE DETAILS
MARVIN VILLEGAS: Patient signed out to me to f/u by MARVIN Amaro for CT A/P to r/o stones, if neg stone, want patient to be treated for pyelonephritis given dysuria and CVAT, with Cefpodoxime x10 days; UA neg, RUQ US neg for acute cholecystitis. Pending CT A/P, Will continue to monitor and reassess. PA BAKARI: Patient reassessed, sitting comfortably in chair in NAD, denies any complaints. States feeling better, symptoms improved. CT A/P neg acute finding, no stones, no appendicitis. Pt is medically stable for discharge and follow up with PMD. The patient was given verbal and written discharge instructions. Specifically, instructions when to return to the ED and when to seek follow-up from their pcp was discussed. Any specialty follow-up was discussed, including how to make an appointment.  Instructions were discussed in simple, plain language and was understood by the patient. The patient understands that should their symptoms worsen or any new symptoms arise, they should return to the ED immediately for further evaluation. All pt's questions were answered. Patient verbalizes understanding. PA BAKARI: pt admits itchiness to Toradol, but no allergic reactions to Ibuprofen in the past.

## 2020-01-22 NOTE — ED ADULT NURSE NOTE - NSIMPLEMENTINTERV_GEN_ALL_ED
Implemented All Universal Safety Interventions:  [place] to call system. Call bell, personal items and telephone within reach. Instruct patient to call for assistance. Room bathroom lighting operational. Non-slip footwear when patient is off stretcher. Physically safe environment: no spills, clutter or unnecessary equipment. Stretcher in lowest position, wheels locked, appropriate side rails in place.

## 2020-01-22 NOTE — ED ADULT NURSE NOTE - OBJECTIVE STATEMENT
Received pt in intake room 6. Pt A&Ox3, ambulatory. Patient c/o RUQ pain radiating to the right shoulder beginning Monday night. Pt also reports frequent urination. Denies any past medical history. Appears stable, and in no apparent distress. Respirations are equal and nonlabored, no respiratory distress noted. Abdomen soft, nontender to touch, no distention noted. Denies any other medical complaints. denies chest pain, sob, n & v, diarrhea, fever/chills, cough, dizziness, headache. 20 gauge iv placed in the right ac, labs sent. iv fluids infusing. pt stable, awaiting further plan

## 2020-01-23 RX ORDER — CEFPODOXIME PROXETIL 100 MG
1 TABLET ORAL
Qty: 20 | Refills: 0
Start: 2020-01-23 | End: 2020-02-01

## 2020-01-23 RX ORDER — ACETAMINOPHEN 500 MG
1 TABLET ORAL
Qty: 28 | Refills: 0
Start: 2020-01-23 | End: 2020-01-29

## 2020-01-24 LAB
BACTERIA UR CULT: SIGNIFICANT CHANGE UP
SPECIMEN SOURCE: SIGNIFICANT CHANGE UP

## 2021-11-05 NOTE — ED PROVIDER NOTE - NS_ATTENDINGSCRIBE_ED_ALL_ED
PAULA BLANCO  81y  MRN: [id number]    Subjective:    Patient is a 81y old  Female who presents with a chief complaint of Referred by [facility] for evaluation of "ESRD" (29 Oct 2021 20:54)      Interval history/overnight events:          MEDICATIONS  (STANDING):  amLODIPine   Tablet 5 milliGRAM(s) Oral daily  atorvastatin 10 milliGRAM(s) Oral at bedtime  brimonidine 0.2% Ophthalmic Solution 1 Drop(s) Right EYE two times a day  carvedilol 25 milliGRAM(s) Oral every 12 hours  dextrose 40% Gel 15 Gram(s) Oral once  dextrose 5%. 1000 milliLiter(s) (50 mL/Hr) IV Continuous <Continuous>  dextrose 5%. 1000 milliLiter(s) (100 mL/Hr) IV Continuous <Continuous>  dextrose 50% Injectable 25 Gram(s) IV Push once  dextrose 50% Injectable 12.5 Gram(s) IV Push once  dextrose 50% Injectable 25 Gram(s) IV Push once  glucagon  Injectable 1 milliGRAM(s) IntraMuscular once  heparin   Injectable 5000 Unit(s) SubCutaneous every 12 hours  influenza  Vaccine (HIGH DOSE) 0.7 milliLiter(s) IntraMuscular once  insulin lispro (ADMELOG) corrective regimen sliding scale   SubCutaneous three times a day before meals  insulin lispro (ADMELOG) corrective regimen sliding scale   SubCutaneous at bedtime  sodium bicarbonate 325 milliGRAM(s) Oral three times a day  sodium chloride 0.9% lock flush 3 milliLiter(s) IV Push every 8 hours  sodium chloride 0.9%. 1000 milliLiter(s) (75 mL/Hr) IV Continuous <Continuous>    MEDICATIONS  (PRN):        Objective:    Vitals: Vital Signs Last 24 Hrs  T(C): 36.7 (10-30-21 @ 05:50), Max: 36.7 (10-29-21 @ 21:50)  T(F): 98 (10-30-21 @ 05:50), Max: 98 (10-29-21 @ 21:50)  HR: 55 (10-30-21 @ 05:50) (55 - 61)  BP: 143/69 (10-30-21 @ 05:50) (116/74 - 152/74)  BP(mean): --  RR: 17 (10-30-21 @ 05:50) (16 - 17)  SpO2: 98% (10-30-21 @ 05:50) (94% - 100%)            I&O's Summary      PHYSICAL EXAM:  GENERAL: NAD, lying in bed  HEENT: No obvious ear lesion, no drainage or erythema. PERRLA, EOM intact, right sidede tongue scar from biopsy on right side, no other oral lesions appreciated, no LAD  CHEST/LUNG: CTAB, no wheezing, crackles, or ronchi   HEART: Bradycardia, no murmur appreciated  ABDOMEN: soft, nondistended, non-tender, normoactive BS  SKIN: No rashes or lesions  NERVOUS SYSTEM: Alert & Oriented X2-3 (name, location was [facility], year with prompting)  EXT: no peripheral edema  PSYCH: calm and cooperative     LABS:    10-29    134<L>  |  100  |  88<H>  ----------------------------<  103<H>  4.2   |  19<L>  |  3.45<H>    Ca    9.1      29 Oct 2021 19:06  Phos  5.0     10-29  Mg     2.20     10-29    TPro  6.5  /  Alb  3.5  /  TBili  <0.2  /  DBili  x   /  AST  7   /  ALT  7   /  AlkPhos  73  10-29                  Urinalysis Basic - ( 29 Oct 2021 23:53 )    Color: Light Yellow / Appearance: Slightly Turbid / S.010 / pH: x  Gluc: x / Ketone: Negative  / Bili: Negative / Urobili: <2 mg/dL   Blood: x / Protein: Trace / Nitrite: Negative   Leuk Esterase: Moderate / RBC: 1 /HPF / WBC 31 /HPF   Sq Epi: x / Non Sq Epi: 8 /HPF / Bacteria: Occasional                              9.0    6.72  )-----------( 344      ( 29 Oct 2021 19:06 )             26.8                         9.5    7.73  )-----------( 322      ( 28 Oct 2021 15:27 )             28.9     CAPILLARY BLOOD GLUCOSE      POCT Blood Glucose.: 92 mg/dL (29 Oct 2021 23:54)          RADIOLOGY & ADDITIONAL TESTS:            Imaging Personally Reviewed:  [ ] YES  [ ] NO    Consultants involved in case:   Consultant(s) Notes Reviewed:  [ ] YES  [ ] NO:   Care Discussed with Consultants/Other Providers [ ] YES  [ ] NO         PAULA BLANCO  81y  MRN: [id number]    Subjective:    Patient is a 81y old  Female who presents with a chief complaint of Referred by [facility] for evaluation of "ESRD" (29 Oct 2021 20:54)      Interval history/overnight events:  No acute events overnight.    The patient feels well this morning. She ate 100% of her breakfast. She reports some tongue and ear pain, but better than before. The patient and family are meeting with hospice today. No other complaints at this time.      MEDICATIONS  (STANDING):  amLODIPine   Tablet 5 milliGRAM(s) Oral daily  atorvastatin 10 milliGRAM(s) Oral at bedtime  brimonidine 0.2% Ophthalmic Solution 1 Drop(s) Right EYE two times a day  carvedilol 25 milliGRAM(s) Oral every 12 hours  dextrose 40% Gel 15 Gram(s) Oral once  dextrose 5%. 1000 milliLiter(s) (50 mL/Hr) IV Continuous <Continuous>  dextrose 5%. 1000 milliLiter(s) (100 mL/Hr) IV Continuous <Continuous>  dextrose 50% Injectable 25 Gram(s) IV Push once  dextrose 50% Injectable 12.5 Gram(s) IV Push once  dextrose 50% Injectable 25 Gram(s) IV Push once  glucagon  Injectable 1 milliGRAM(s) IntraMuscular once  heparin   Injectable 5000 Unit(s) SubCutaneous every 12 hours  influenza  Vaccine (HIGH DOSE) 0.7 milliLiter(s) IntraMuscular once  insulin lispro (ADMELOG) corrective regimen sliding scale   SubCutaneous three times a day before meals  insulin lispro (ADMELOG) corrective regimen sliding scale   SubCutaneous at bedtime  sodium bicarbonate 325 milliGRAM(s) Oral three times a day  sodium chloride 0.9% lock flush 3 milliLiter(s) IV Push every 8 hours  sodium chloride 0.9%. 1000 milliLiter(s) (75 mL/Hr) IV Continuous <Continuous>    MEDICATIONS  (PRN):        Objective:    Vitals: Vital Signs Last 24 Hrs  T(C): 36.7 (10-30-21 @ 05:50), Max: 36.7 (10-29-21 @ 21:50)  T(F): 98 (10-30-21 @ 05:50), Max: 98 (10-29-21 @ 21:50)  HR: 55 (10-30-21 @ 05:50) (55 - 61)  BP: 143/69 (10-30-21 @ 05:50) (116/74 - 152/74)  BP(mean): --  RR: 17 (10-30-21 @ 05:50) (16 - 17)  SpO2: 98% (10-30-21 @ 05:50) (94% - 100%)            I&O's Summary      PHYSICAL EXAM:  GENERAL: NAD, lying in bed  HEENT: No obvious ear lesion, no drainage or erythema. PERRLA, EOM intact, right sided tongue scar from biopsy on right side, no other oral lesions appreciated, no LAD  CHEST/LUNG: CTAB, no wheezing, crackles, or ronchi   HEART: Bradycardia, no murmur appreciated  ABDOMEN: soft, nondistended, non-tender, normoactive BS  SKIN: No rashes or lesions  NERVOUS SYSTEM: Alert & Oriented X2-3 (name, location was hospital, year with prompting)  EXT: no peripheral edema  PSYCH: calm and cooperative     LABS:    10-29    134<L>  |  100  |  88<H>  ----------------------------<  103<H>  4.2   |  19<L>  |  3.45<H>    Ca    9.1      29 Oct 2021 19:06  Phos  5.0     10-29  Mg     2.20     10-29    TPro  6.5  /  Alb  3.5  /  TBili  <0.2  /  DBili  x   /  AST  7   /  ALT  7   /  AlkPhos  73  10-29                  Urinalysis Basic - ( 29 Oct 2021 23:53 )    Color: Light Yellow / Appearance: Slightly Turbid / S.010 / pH: x  Gluc: x / Ketone: Negative  / Bili: Negative / Urobili: <2 mg/dL   Blood: x / Protein: Trace / Nitrite: Negative   Leuk Esterase: Moderate / RBC: 1 /HPF / WBC 31 /HPF   Sq Epi: x / Non Sq Epi: 8 /HPF / Bacteria: Occasional                              9.0    6.72  )-----------( 344      ( 29 Oct 2021 19:06 )             26.8                         9.5    7.73  )-----------( 322      ( 28 Oct 2021 15:27 )             28.9     CAPILLARY BLOOD GLUCOSE      POCT Blood Glucose.: 92 mg/dL (29 Oct 2021 23:54)          RADIOLOGY & ADDITIONAL TESTS:            Imaging Personally Reviewed:  [ ] YES  [ ] NO    Consultants involved in case:   Consultant(s) Notes Reviewed:  [ ] YES  [ ] NO:   Care Discussed with Consultants/Other Providers [ ] YES  [ ] NO         PAULA BLANCO  81y  MRN: [id number]    Subjective:    Patient is a 81y old  Female who presents with a chief complaint of Referred by [facility] for evaluation of "ESRD" (29 Oct 2021 20:54)      Interval history/overnight events:  No acute events overnight.    The patient feels well this morning. She ate 100% of her breakfast. She reports some tongue and ear pain, but better than before. The patient and family are meeting with hospice today. No other complaints at this time.      MEDICATIONS  (STANDING):  amLODIPine   Tablet 5 milliGRAM(s) Oral daily  atorvastatin 10 milliGRAM(s) Oral at bedtime  brimonidine 0.2% Ophthalmic Solution 1 Drop(s) Right EYE two times a day  carvedilol 25 milliGRAM(s) Oral every 12 hours  dextrose 40% Gel 15 Gram(s) Oral once  dextrose 5%. 1000 milliLiter(s) (50 mL/Hr) IV Continuous <Continuous>  dextrose 5%. 1000 milliLiter(s) (100 mL/Hr) IV Continuous <Continuous>  dextrose 50% Injectable 25 Gram(s) IV Push once  dextrose 50% Injectable 12.5 Gram(s) IV Push once  dextrose 50% Injectable 25 Gram(s) IV Push once  glucagon  Injectable 1 milliGRAM(s) IntraMuscular once  heparin   Injectable 5000 Unit(s) SubCutaneous every 12 hours  influenza  Vaccine (HIGH DOSE) 0.7 milliLiter(s) IntraMuscular once  insulin lispro (ADMELOG) corrective regimen sliding scale   SubCutaneous three times a day before meals  insulin lispro (ADMELOG) corrective regimen sliding scale   SubCutaneous at bedtime  sodium bicarbonate 325 milliGRAM(s) Oral three times a day  sodium chloride 0.9% lock flush 3 milliLiter(s) IV Push every 8 hours  sodium chloride 0.9%. 1000 milliLiter(s) (75 mL/Hr) IV Continuous <Continuous>    MEDICATIONS  (PRN):    Objective:    Vitals: Vital Signs Last 24 Hrs  T(C): 36.7 (10-30-21 @ 05:50), Max: 36.7 (10-29-21 @ 21:50)  T(F): 98 (10-30-21 @ 05:50), Max: 98 (10-29-21 @ 21:50)  HR: 55 (10-30-21 @ 05:50) (55 - 61)  BP: 143/69 (10-30-21 @ 05:50) (116/74 - 152/74)  BP(mean): --  RR: 17 (10-30-21 @ 05:50) (16 - 17)  SpO2: 98% (10-30-21 @ 05:50) (94% - 100%)            I&O's Summary      PHYSICAL EXAM:  GENERAL: NAD, lying in bed  HEENT: No obvious ear lesion, no drainage or erythema. PERRLA, EOM intact, right sided tongue scar from biopsy on right side, no other oral lesions appreciated, no LAD  CHEST/LUNG: CTAB, no wheezing, crackles, or ronchi   HEART: Bradycardia, no murmur appreciated  ABDOMEN: soft, nondistended, non-tender, normoactive BS  SKIN: No rashes or lesions  NERVOUS SYSTEM: Alert & Oriented X2-3 (name, location was hospital, year with prompting)  EXT: no peripheral edema  PSYCH: calm and cooperative     LABS:    10-29    134<L>  |  100  |  88<H>  ----------------------------<  103<H>  4.2   |  19<L>  |  3.45<H>    Ca    9.1      29 Oct 2021 19:06  Phos  5.0     10-29  Mg     2.20     10-29    TPro  6.5  /  Alb  3.5  /  TBili  <0.2  /  DBili  x   /  AST  7   /  ALT  7   /  AlkPhos  73  10-29                  Urinalysis Basic - ( 29 Oct 2021 23:53 )    Color: Light Yellow / Appearance: Slightly Turbid / S.010 / pH: x  Gluc: x / Ketone: Negative  / Bili: Negative / Urobili: <2 mg/dL   Blood: x / Protein: Trace / Nitrite: Negative   Leuk Esterase: Moderate / RBC: 1 /HPF / WBC 31 /HPF   Sq Epi: x / Non Sq Epi: 8 /HPF / Bacteria: Occasional                              9.0    6.72  )-----------( 344      ( 29 Oct 2021 19:06 )             26.8                         9.5    7.73  )-----------( 322      ( 28 Oct 2021 15:27 )             28.9     CAPILLARY BLOOD GLUCOSE      POCT Blood Glucose.: 92 mg/dL (29 Oct 2021 23:54)          RADIOLOGY & ADDITIONAL TESTS:            Imaging Personally Reviewed:  [ ] YES  [ ] NO    Consultants involved in case:   Consultant(s) Notes Reviewed:  [ ] YES  [ ] NO:   Care Discussed with Consultants/Other Providers [ ] YES  [ ] NO         I personally performed the service described in the documentation recorded by the scribe in my presence, and it accurately and completely records my words and actions.

## 2022-01-17 NOTE — ED ADULT TRIAGE NOTE - ARRIVAL FROM
Continue current dose of warfarin as instructed on dosing calendar provided - 5 mg every Wednesday and 10 mg all other days. Return to clinic in 6 weeks. Continue to monitor urine and stool for signs and symptoms of bleeding. Please notify the clinic of any medication changes. Home

## 2023-11-15 NOTE — ED PROVIDER NOTE - DISCHARGE DATE
12-Feb-2018 [facility] Pregnancy And Lactation Text: This medication has not been assigned a Pregnancy Risk Category. It is unknown if the medication is excreted in breast milk.

## 2024-02-26 ENCOUNTER — EMERGENCY (EMERGENCY)
Facility: HOSPITAL | Age: 34
LOS: 0 days | Discharge: LEFT BEFORE DISCHARGE | End: 2024-02-26
Payer: COMMERCIAL

## 2024-02-26 VITALS
SYSTOLIC BLOOD PRESSURE: 136 MMHG | OXYGEN SATURATION: 97 % | TEMPERATURE: 98 F | DIASTOLIC BLOOD PRESSURE: 97 MMHG | WEIGHT: 119.93 LBS | HEART RATE: 98 BPM | RESPIRATION RATE: 18 BRPM | HEIGHT: 61 IN

## 2024-02-26 DIAGNOSIS — Z53.21 PROCEDURE AND TREATMENT NOT CARRIED OUT DUE TO PATIENT LEAVING PRIOR TO BEING SEEN BY HEALTH CARE PROVIDER: ICD-10-CM

## 2024-02-26 DIAGNOSIS — R42 DIZZINESS AND GIDDINESS: ICD-10-CM

## 2024-02-26 DIAGNOSIS — Z98.891 HISTORY OF UTERINE SCAR FROM PREVIOUS SURGERY: Chronic | ICD-10-CM

## 2024-02-26 PROCEDURE — L9991: CPT

## 2024-02-26 PROCEDURE — 93010 ELECTROCARDIOGRAM REPORT: CPT

## 2024-02-26 NOTE — ED ADULT TRIAGE NOTE - CHIEF COMPLAINT QUOTE
pt c/o headache, generalized weakness, nausea, light Sensitvity , dizziness and palpitation for 2 days . no history. pt c/o headache, generalized weakness, nausea, light Sensitvity , dizziness and palpitation for 2 days . denies weakness or facial droop. no history.

## 2024-02-26 NOTE — ED ADULT TRIAGE NOTE - BEFAST SPEECH PHRASE
Spoke with patient via phone.     LAST INR:  3.7  Patient Findings: All are negative.     anticoag updated.        Yes

## 2024-03-14 ENCOUNTER — EMERGENCY (EMERGENCY)
Facility: HOSPITAL | Age: 34
LOS: 1 days | Discharge: HOME | End: 2024-03-14
Attending: EMERGENCY MEDICINE | Admitting: EMERGENCY MEDICINE
Payer: MEDICAID

## 2024-03-14 VITALS
DIASTOLIC BLOOD PRESSURE: 99 MMHG | HEART RATE: 111 BPM | SYSTOLIC BLOOD PRESSURE: 135 MMHG | RESPIRATION RATE: 18 BRPM | HEIGHT: 61 IN | OXYGEN SATURATION: 98 % | TEMPERATURE: 98 F

## 2024-03-14 VITALS
HEART RATE: 97 BPM | TEMPERATURE: 98 F | DIASTOLIC BLOOD PRESSURE: 87 MMHG | RESPIRATION RATE: 18 BRPM | OXYGEN SATURATION: 100 % | SYSTOLIC BLOOD PRESSURE: 121 MMHG

## 2024-03-14 DIAGNOSIS — Z98.891 HISTORY OF UTERINE SCAR FROM PREVIOUS SURGERY: Chronic | ICD-10-CM

## 2024-03-14 LAB
ALBUMIN SERPL ELPH-MCNC: 4.3 G/DL — SIGNIFICANT CHANGE UP (ref 3.3–5)
ALP SERPL-CCNC: 66 U/L — SIGNIFICANT CHANGE UP (ref 40–120)
ALT FLD-CCNC: 13 U/L — SIGNIFICANT CHANGE UP (ref 4–33)
ANION GAP SERPL CALC-SCNC: 13 MMOL/L — SIGNIFICANT CHANGE UP (ref 7–14)
AST SERPL-CCNC: 14 U/L — SIGNIFICANT CHANGE UP (ref 4–32)
BASOPHILS # BLD AUTO: 0.07 K/UL — SIGNIFICANT CHANGE UP (ref 0–0.2)
BASOPHILS NFR BLD AUTO: 0.7 % — SIGNIFICANT CHANGE UP (ref 0–2)
BILIRUB SERPL-MCNC: 0.3 MG/DL — SIGNIFICANT CHANGE UP (ref 0.2–1.2)
BUN SERPL-MCNC: 8 MG/DL — SIGNIFICANT CHANGE UP (ref 7–23)
CALCIUM SERPL-MCNC: 8.9 MG/DL — SIGNIFICANT CHANGE UP (ref 8.4–10.5)
CHLORIDE SERPL-SCNC: 104 MMOL/L — SIGNIFICANT CHANGE UP (ref 98–107)
CO2 SERPL-SCNC: 22 MMOL/L — SIGNIFICANT CHANGE UP (ref 22–31)
CREAT SERPL-MCNC: 0.58 MG/DL — SIGNIFICANT CHANGE UP (ref 0.5–1.3)
EGFR: 121 ML/MIN/1.73M2 — SIGNIFICANT CHANGE UP
EOSINOPHIL # BLD AUTO: 0.07 K/UL — SIGNIFICANT CHANGE UP (ref 0–0.5)
EOSINOPHIL NFR BLD AUTO: 0.7 % — SIGNIFICANT CHANGE UP (ref 0–6)
GLUCOSE SERPL-MCNC: 97 MG/DL — SIGNIFICANT CHANGE UP (ref 70–99)
HCG SERPL-ACNC: <1 MIU/ML — SIGNIFICANT CHANGE UP
HCT VFR BLD CALC: 37.7 % — SIGNIFICANT CHANGE UP (ref 34.5–45)
HGB BLD-MCNC: 12.6 G/DL — SIGNIFICANT CHANGE UP (ref 11.5–15.5)
IANC: 6.45 K/UL — SIGNIFICANT CHANGE UP (ref 1.8–7.4)
IMM GRANULOCYTES NFR BLD AUTO: 0.3 % — SIGNIFICANT CHANGE UP (ref 0–0.9)
LYMPHOCYTES # BLD AUTO: 2.61 K/UL — SIGNIFICANT CHANGE UP (ref 1–3.3)
LYMPHOCYTES # BLD AUTO: 26.5 % — SIGNIFICANT CHANGE UP (ref 13–44)
MCHC RBC-ENTMCNC: 28 PG — SIGNIFICANT CHANGE UP (ref 27–34)
MCHC RBC-ENTMCNC: 33.4 GM/DL — SIGNIFICANT CHANGE UP (ref 32–36)
MCV RBC AUTO: 83.8 FL — SIGNIFICANT CHANGE UP (ref 80–100)
MONOCYTES # BLD AUTO: 0.62 K/UL — SIGNIFICANT CHANGE UP (ref 0–0.9)
MONOCYTES NFR BLD AUTO: 6.3 % — SIGNIFICANT CHANGE UP (ref 2–14)
NEUTROPHILS # BLD AUTO: 6.45 K/UL — SIGNIFICANT CHANGE UP (ref 1.8–7.4)
NEUTROPHILS NFR BLD AUTO: 65.5 % — SIGNIFICANT CHANGE UP (ref 43–77)
NRBC # BLD: 0 /100 WBCS — SIGNIFICANT CHANGE UP (ref 0–0)
NRBC # FLD: 0 K/UL — SIGNIFICANT CHANGE UP (ref 0–0)
PLATELET # BLD AUTO: 300 K/UL — SIGNIFICANT CHANGE UP (ref 150–400)
POTASSIUM SERPL-MCNC: 3.9 MMOL/L — SIGNIFICANT CHANGE UP (ref 3.5–5.3)
POTASSIUM SERPL-SCNC: 3.9 MMOL/L — SIGNIFICANT CHANGE UP (ref 3.5–5.3)
PROT SERPL-MCNC: 7.3 G/DL — SIGNIFICANT CHANGE UP (ref 6–8.3)
RBC # BLD: 4.5 M/UL — SIGNIFICANT CHANGE UP (ref 3.8–5.2)
RBC # FLD: 13.4 % — SIGNIFICANT CHANGE UP (ref 10.3–14.5)
SODIUM SERPL-SCNC: 139 MMOL/L — SIGNIFICANT CHANGE UP (ref 135–145)
WBC # BLD: 9.85 K/UL — SIGNIFICANT CHANGE UP (ref 3.8–10.5)
WBC # FLD AUTO: 9.85 K/UL — SIGNIFICANT CHANGE UP (ref 3.8–10.5)

## 2024-03-14 PROCEDURE — 99284 EMERGENCY DEPT VISIT MOD MDM: CPT

## 2024-03-14 PROCEDURE — 70450 CT HEAD/BRAIN W/O DYE: CPT | Mod: 26,MC

## 2024-03-14 PROCEDURE — 93010 ELECTROCARDIOGRAM REPORT: CPT

## 2024-03-14 RX ORDER — ACETAMINOPHEN 500 MG
975 TABLET ORAL ONCE
Refills: 0 | Status: COMPLETED | OUTPATIENT
Start: 2024-03-14 | End: 2024-03-14

## 2024-03-14 RX ADMIN — Medication 975 MILLIGRAM(S): at 03:04

## 2024-03-14 NOTE — ED ADULT NURSE NOTE - OBJECTIVE STATEMENT
Break RN: Pt is a 32 y/o Female, A&Ox4, ambulatory with no reported pertinent PHx. Pt presents to the ED with c/o severe headache since yesterday. Pt also endorses nausea and photosensitivity. Pt states headache is predominantly in back of head and radiates to back of neck. Neuro/sensory otherwise intact. Respirations even and unlabored, chest rise equal b/l. VS as noted in flow sheets. Pt denies recent falls, injury or trauma, chest pain, SOB, fever, cough, chills, abdominal pain, V/D, dizziness, numbness/tingling or any urinary symptoms at this time. 20g IVL placed in LAC. Labs collected and sent. Pt requesting to take Tylenol after CT scan. No acute distress noted. Safety maintained throughout.

## 2024-03-14 NOTE — ED PROVIDER NOTE - PATIENT PORTAL LINK FT
You can access the FollowMyHealth Patient Portal offered by [facility] by registering at the following website: [email or web address]. By joining [employer]’s FollowMyHealth portal, you will also be able to view your health information using other applications (apps) compatible with our system.

## 2024-03-14 NOTE — ED PROVIDER NOTE - PHYSICAL EXAMINATION
Constitutional: VS reviewed. Alert and orientedx3, well appearing, no apparent distress  HEENT: Atraumatic, EOMI, PERRL, + TTP of b/l paraspinal neck muscles  CV: RRR  Lungs: Clear and equal bilaterally, no wheezes, rales or crackles  Abdomen: Soft, nondistended, nontender  MSK: No deformities  Skin: Warm and dry. As visualized no rashes, lesions, bruising or erythema  Neuro: Strength 5/5 in all extremities. Sensation intact.   Lymph: No pitting edema in extremities.

## 2024-03-14 NOTE — ED ADULT TRIAGE NOTE - CHIEF COMPLAINT QUOTE
Pt c/o severe headache radiating to eyes, LUQ abdominal pain, nausea, photosensitivity, subjective fever, chills. Denies Hx

## 2024-03-14 NOTE — ED PROVIDER NOTE - OBJECTIVE STATEMENT
33-year-old female with no known past medical history presents emergency department for 1 day of headache and neck pain.  Patient states that headache is throbbing and diffuse, radiates down both sides of neck.  Patient also endorsing photophobia and nausea.  Patient has not taken any medications for her pain, this states that she is holistic and does not like to take medications.  Patient endorses episode of chills and left upper quadrant abdominal pain that has since resolved.  Patient denies fevers, vision changes, chest pain, trouble breathing, vomiting, diarrhea/constipation, dysuria, hematuria.

## 2024-03-14 NOTE — ED PROVIDER NOTE - PROGRESS NOTE DETAILS
Missy Bray PGY2: Pt reassessed and resting comfortably. Results discussed with pt. Pt okay for dc. DC instructions and return precautions discussed with patient. Questions answered. Pt to follow up with PCP.

## 2024-03-14 NOTE — ED PROVIDER NOTE - CLINICAL SUMMARY MEDICAL DECISION MAKING FREE TEXT BOX
33-year-old female with no known past medical history presents emergency department for 1 day of headache and neck pain. 33-year-old female with no known past medical history presents emergency department for 1 day of headache and neck pain. Pt came to ED about 2 weeks ago for the same symptoms. Pt well appearing. No focal neuro deficits. + b/l paraspinal muscle TTP. Differentials include but not limited to migraine, tension headache, viral illness. Low concern for SAH. Plan for labs, CTH. Dispo likely home. Rylan att: 33-year-old female with no known past medical history presents emergency department for 1 day of headache and neck pain. Pt came to ED about 2 weeks ago for the same symptoms. Pt well appearing. No focal neuro deficits. + b/l paraspinal muscle TTP. Differentials include but not limited to migraine, tension headache, viral illness. Low concern for SAH. Plan for labs, CTH. Dispo likely home.

## 2025-04-01 NOTE — ED PROVIDER NOTE - CONTEXT
Spoke to the patient she would like to speak to you, she said to try and call her Thursday she is off on that day.   
coughing

## 2025-05-19 NOTE — ED ADULT TRIAGE NOTE - PAIN: PRESENCE, MLM
Please check home blood pressures with feet flat on the ground after 5 minutes of relaxation. Ensure the blood pressure cuff fits snugly on your arm. Keep your arm RESTED on a table/flat surface at heart level. Please WRITE DOWN your blood pressures on a paper or on your phone.      The 4 Principles of Healthy Living.  Do Not Smoke.  Maintain a BMI<30.  Exercise 150 minutes/week. (30 minutes 5 days a week of some form of aerobic exercise)  Eat 5 servings of fruits or vegetables daily.         Several studies have conclusively shown that individuals who do these things have a dramatic reduction in overall mortality, heart disease, diabetes, hypertension, stroke, congestive heart failure, and cancer.  This means that without taking any pills, vitamins, tonics, etc - without spending a single carlo - you can live a longer, healthier, more productive life.  Let's look at these 4 principles separately.      1. Do Not Smoke - Make up your mind to quit, talk with your doctor to formulate a plan, and set a date.     2. Get to and maintain a BMI<30.  This gets you out of the obese category.  No longer being obese will dramatically reduce you and your family's risk of a multitude of health problems.  It is not easy, but, it is possible.  If you are extremely obese it may take years, but, each step you take will lead to dramatic rewards.  With just 20 pounds of weight loss most people feel better, have less fatigue and joint pain, and feel more energetic.  If you have health problems like diabetes, hypertension, or high cholesterol you might do away with your need for some medications.  And most of all, while you change you lifestyle to attain this goal you will set a good example for all those around you, especially your children.  Here are two proven steps to help you start losing weight.  Portion Control- Eating your meals on a smaller plate and limiting the amount of calories you eat at each meal has been shown to lead to  weight loss. The average plate is 10 inches in diameter.  A 10 inch plate piled high with food can add up to 1500 calories (even more if you go back for seconds).  The typical man needs 2000 calories per day total.  Try using a smaller plate (8 inch paper plate or 7 inch saucer) at each meal and NEVER go back for second helpings.  Pedometer- individuals who wear a pedometer and try to walk 10,000 steps each day increase their physical activity, lose weight, and decrease their blood pressures.     3. Exercise 150 minutes/week.  The overall health benefits of regular aerobic exercise are overwhelming:     Reduces the risk of dying prematurely.  Reduces the risk of dying from heart disease.   Reduces the risk of stroke.   Reduces the risk of developing diabetes.   Reduces the risk of developing high blood pressure.   Helps reduce blood pressure in people who already have high blood pressure.   Reduces the risk of developing colon cancer.   Reduces feelings of depression and anxiety.   Helps control weight.   Helps build and maintain healthy bones, muscles and joints.   Helps older adults become stronger and better able to move about without falling.   Promotes psychological well-being.       If you have health problems or are over age 60 we recommend consulting your doctor before beginning.  We recommend starting slow and working up.  Start by reading the [employer]note on Starting an Exercise Program then get going.  Do not over think the process.  Pick something simple at home like walking with friends, using a treadmill, or riding an exercise bike.  [place] with different types of exercise until you find something you can tolerate (it does not have to be fun).  Pick a 30 minute disc of inspirational music and listen to it while you exercise.  The more you do it the easier it will become and the better you will feel.     4. Eat 5 servings of fruits or vegetables daily.     A serving size is:  One medium-size fruit    1/2 cup raw, cooked, frozen or canned fruits (in 100% juice) or vegetables   3/4 cup (6 oz.) 100% fruit or vegetable juice   1 cup raw, leafy vegetables   1/4 cup dried fruit      While this sounds easy enough actually getting this much fruits and vegetables takes some work and planning.  You will need to experiment with different types of fruits and vegetables to find ones you and your family can eat every day.  Some simple tips include:      - Add fruit to your cereal each morning.   - Eat a salad each day for lunch.  The typical bowl of salad counts for 2 servings of vegetables.     - Have some fruits and vegetables at every meal.  Use canned or frozen products if needed.   - Eat fruits and vegetables for snacks   - Replace the side of fries or chips with a cup of fruit, an apple, or a bowl of celery.     What are the benefits?  Reduces heart disease and stroke.  Possible reduction in cancer risk.  Protects against the development of diabetes.  Filling up on fat free fruits and vegetable decreases the amount of high fat foods you will eat, aiding in weight loss.     You can find more information on healthy living at [email or web address].     The AHA Physical Activity Guidelines Advisory Committee Scientific Report recommends that:  Adults should move more and sit less throughout the day. SOME physical activity is better than none. Adults who sit less  and do ANY amount of moderate-to-vigorous physical activity gain some health benefits.  For substantial health benefits (BP, Cholesterol, Overall Heart Health), the AHA recommends at least 150 minutes per week of moderate-intensity, or 75 minutes of vigorous-intensity aerobic physical activity. Preferably, aerobic exercise should be spread out throughout the week.  Additional health benefits are gained by engaging in physical activity beyond the equivalent of moderate-intensity physical activity per week.  Adults should also do  muscle-strengthening activities of at least moderate-intensity at least 2 days a week, as these also provide additional health benefits.  ANY amount of physical activity counts towards the total goal. This means that parking further away or taking the stairs instead of the elevator COUNTS towards overall health benefits.  Benefits include:  Decreased incidence of Cardiovascular Disease  Decreased mortality of Cardiovascular Disease  Slowed progression of Cardiovascular Disease  Reduced Blood Pressure  Weight Loss  Prevention of weight regain  Decreased incidence of Diabetes  Decreased incidence of high cholesterol     AHA Diet and Lifestyle Recommendations:  Eat an overall diet that emphasizes:  Fruits and Vegetables  Whole grain products  Healthy sources of proteins: Legumes, nuts, fish, seafood, low-fat dairy, lean and unprocessed meat and poultry  Vegetable oils  Minimizing processed foods  Minimizing added sugars  Minimizing extra salt intake  Limited alcohol intake  Live Tobacco Free  Examples:  Mediterranean Diet  DASH Diet    complains of pain/discomfort